# Patient Record
Sex: MALE | Race: WHITE | NOT HISPANIC OR LATINO | URBAN - METROPOLITAN AREA
[De-identification: names, ages, dates, MRNs, and addresses within clinical notes are randomized per-mention and may not be internally consistent; named-entity substitution may affect disease eponyms.]

---

## 2021-01-12 ENCOUNTER — EMERGENCY (EMERGENCY)
Facility: HOSPITAL | Age: 71
LOS: 1 days | Discharge: HOME | End: 2021-01-12
Attending: EMERGENCY MEDICINE | Admitting: EMERGENCY MEDICINE
Payer: MEDICARE

## 2021-01-12 VITALS
WEIGHT: 190.04 LBS | OXYGEN SATURATION: 95 % | TEMPERATURE: 98 F | HEART RATE: 141 BPM | HEIGHT: 68 IN | SYSTOLIC BLOOD PRESSURE: 151 MMHG | RESPIRATION RATE: 20 BRPM | DIASTOLIC BLOOD PRESSURE: 105 MMHG

## 2021-01-12 VITALS
RESPIRATION RATE: 16 BRPM | SYSTOLIC BLOOD PRESSURE: 125 MMHG | OXYGEN SATURATION: 98 % | DIASTOLIC BLOOD PRESSURE: 74 MMHG | HEART RATE: 107 BPM

## 2021-01-12 DIAGNOSIS — R33.9 RETENTION OF URINE, UNSPECIFIED: ICD-10-CM

## 2021-01-12 PROCEDURE — 81001 URINALYSIS AUTO W/SCOPE: CPT

## 2021-01-12 PROCEDURE — 87086 URINE CULTURE/COLONY COUNT: CPT

## 2021-01-12 PROCEDURE — 85025 COMPLETE CBC W/AUTO DIFF WBC: CPT

## 2021-01-12 PROCEDURE — 99284 EMERGENCY DEPT VISIT MOD MDM: CPT

## 2021-01-12 PROCEDURE — 80053 COMPREHEN METABOLIC PANEL: CPT

## 2021-01-12 PROCEDURE — 36415 COLL VENOUS BLD VENIPUNCTURE: CPT

## 2021-01-12 PROCEDURE — 51702 INSERT TEMP BLADDER CATH: CPT

## 2021-01-12 PROCEDURE — 96374 THER/PROPH/DIAG INJ IV PUSH: CPT | Mod: XU

## 2021-01-12 PROCEDURE — 51703 INSERT BLADDER CATH COMPLEX: CPT

## 2021-01-12 PROCEDURE — 99284 EMERGENCY DEPT VISIT MOD MDM: CPT | Mod: 25

## 2021-01-12 RX ORDER — TAMSULOSIN HYDROCHLORIDE 0.4 MG/1
1 CAPSULE ORAL
Qty: 30 | Refills: 0
Start: 2021-01-12 | End: 2021-02-10

## 2021-01-12 RX ORDER — CEFTRIAXONE 500 MG/1
1000 INJECTION, POWDER, FOR SOLUTION INTRAMUSCULAR; INTRAVENOUS ONCE
Refills: 0 | Status: COMPLETED | OUTPATIENT
Start: 2021-01-12 | End: 2021-01-12

## 2021-01-12 RX ORDER — CEFPODOXIME PROXETIL 100 MG
1 TABLET ORAL
Qty: 14 | Refills: 0
Start: 2021-01-12 | End: 2021-01-18

## 2021-01-12 RX ADMIN — CEFTRIAXONE 100 MILLIGRAM(S): 500 INJECTION, POWDER, FOR SOLUTION INTRAMUSCULAR; INTRAVENOUS at 13:32

## 2021-01-12 NOTE — ED PROVIDER NOTE - CLINICAL SUMMARY MEDICAL DECISION MAKING FREE TEXT BOX
urinary retention - mackey placed urinary retention - mackey placed- elev wbc count noted- will cover w abx- urology fu in - 2-3 days

## 2021-01-12 NOTE — ED PROVIDER NOTE - NSFOLLOWUPINSTRUCTIONS_ED_ALL_ED_FT
URINARY RETENTION IN MEN - AfterCare(R) Instructions(ER/ED)           Urinary Retention in Men    WHAT YOU NEED TO KNOW:    Urinary retention is a condition that develops when your bladder does not empty completely when you urinate.     Male Reproductive System         DISCHARGE INSTRUCTIONS:    Medicines:   •Medicines can help decrease the size of your prostate, fight infection, and help you urinate more easily.      •Take your medicine as directed. Contact your healthcare provider if you think your medicine is not helping or if you have side effects. Tell him or her if you are allergic to any medicine. Keep a list of the medicines, vitamins, and herbs you take. Include the amounts, and when and why you take them. Bring the list or the pill bottles to follow-up visits. Carry your medicine list with you in case of an emergency.      Weir catheter care: You may need a Weir catheter for up to 2 weeks at home. Healthcare providers will give you a smaller leg bag to collect urine. Keep the bag below your waist. This will prevent urine from flowing back into your bladder and causing an infection or other problems. Also, keep the tube free of kinks so the urine will drain properly. Do not pull on the catheter. This can cause pain and bleeding, and may cause the catheter to come out. Ask your healthcare provider or urologist for more information on Weir catheter care.    Urinate regularly: When your catheter is removed, do not let your bladder become too full before you urinate. Set regular times each day to urinate. Urinate as soon as you feel the need or at least every 3 hours while you are awake. Do not drink liquids before you go to bed. Urinate right before you go to bed.    Follow up with your healthcare provider or urologist as directed: Write down your questions so you remember to ask them during your visits.     Contact your healthcare provider or urologist if:   •You have a fever.      •You have pain when you urinate.      •You have blood in your urine.      •You have problems with your catheter.      •You have questions or concerns about your condition or care.      Return to the emergency department if:   •You have severe abdominal pain.      •You are breathing faster than usual.      •Your heartbeat is faster than usual.      •Your face, hands, feet, or ankles are swollen.          © Copyright [employer] 2021           back to top                          © Copyright [employer] 2021

## 2021-01-12 NOTE — PROCEDURE NOTE - NSURITECHNIQUE_GU_A_CORE
Proper hand hygiene was performed/Sterile gloves were worn for the duration of the procedure/A sterile drape was used to cover all adjacent areas/The catheter was appropriately lubricated/The catheter was secured with a securement device (e.g. StatLock)/The urinary drainage system is closed at the end of the procedure/The collection bag is below the level of the patient and urinary bladder/All applicable medical record documentation is completed

## 2021-01-12 NOTE — ED ADULT NURSE NOTE - CHPI ED NUR SYMPTOMS NEG
no blood in stool/no burning urination/no chills/no diarrhea/no dysuria/no hematuria/no nausea/no vomiting

## 2021-01-12 NOTE — ED PROVIDER NOTE - OBJECTIVE STATEMENT
70 M hx of prostate partial removal 7 yrs ago- presents w difficulty urinating over the past 2-3 days- only passing small amount of urine, increasing lower abd pressure/pain  urge to urinate- only tiny amount comes out  no sig exac factors  feeling relief after catheter placement  moderate-severe

## 2021-01-12 NOTE — ED ADULT NURSE NOTE - IS THE PATIENT ABLE TO BE SCREENED?
Recorded as Task  Date: 09/13/2017 04:02 PM, Created By: YODIT WHITAKER  Task Name: 4. Patient Message  Assigned To: Sydni Ibarra  Regarding Patient: MALACHI SCOTT, Status: Active  Comment:   YODIT WHITAKER - 13 Sep 2017 4:02 PM    TASK CREATED  Tell the patient that her ferritin level was fine and no further iron replacement therapy is needed.  Mai Curry - 14 Sep 2017 10:07 AM    TASK EDITED  Sydni Ibarra - 14 Sep 2017 10:49 AM    TASK REPLIED TO: Previously Assigned To Sydni Ibarra               called patient and told her to stop iron replacement.  pt voices understandin and is thankful for call      Electronically signed by:Sydni Ibarra   Sep 14 2017 10:50AM CST    
Yes

## 2021-01-12 NOTE — ED ADULT NURSE REASSESSMENT NOTE - NS ED NURSE REASSESS COMMENT FT1
Reeval by Dr. Quintanilla.  Weir catheter bag changed to leg bag, leg bag teaching provided, patient performed teach back and verbalized understanding of instructions.  IV abx infusing per order, will discharge after.

## 2021-01-12 NOTE — ED ADULT NURSE REASSESSMENT NOTE - NS ED NURSE REASSESS COMMENT FT1
Assumed care of patient at this time, 20g IV noted L AC, patient reporting difficulty urinating "the entire weekend."  States "dribbling" x 4 days, went to urgent care yesterday where he was given "a medication."  Now reporting significant pain.  Another RN attempted mackey catheter placement with resistance, patient evaluated by Dr. Quintanilla, urology PA now at the bedside.

## 2021-01-12 NOTE — ED ADULT NURSE REASSESSMENT NOTE - NS ED NURSE REASSESS COMMENT FT1
Weir catheter placed by urology PA, drained approx 1300ml oscar urine.  Vitals rechecked, patient feels significant relief.

## 2021-01-12 NOTE — ED ADULT NURSE NOTE - NSIMPLEMENTINTERV_GEN_ALL_ED
Implemented All Universal Safety Interventions:  Tony to call system. Call bell, personal items and telephone within reach. Instruct patient to call for assistance. Room bathroom lighting operational. Non-slip footwear when patient is off stretcher. Physically safe environment: no spills, clutter or unnecessary equipment. Stretcher in lowest position, wheels locked, appropriate side rails in place.

## 2021-01-12 NOTE — ED PROVIDER NOTE - PATIENT PORTAL LINK FT
You can access the FollowMyHealth Patient Portal offered by [facility] by registering at the following website: [email or web address]. By joining [employer]’s FollowMyHealth portal, you will also be able to view your health information using other applications (apps) compatible with our system.

## 2021-01-12 NOTE — ED ADULT NURSE NOTE - OBJECTIVE STATEMENT
70 y.o M a&ox4 walked in from triage c.o urinary retention. x 3 days of urinary retention, has only been able to uriante " a little biut." was txt for UTI x 1 week ago, unknown abx, took full course. PMH of BPH. denies fevers, chills, SOB, CP, dizziness, n/v/d.

## 2021-01-15 ENCOUNTER — APPOINTMENT (OUTPATIENT)
Dept: UROLOGY | Facility: CLINIC | Age: 71
End: 2021-01-15
Payer: MEDICARE

## 2021-01-15 PROCEDURE — 99204 OFFICE O/P NEW MOD 45 MIN: CPT

## 2021-01-15 NOTE — PHYSICAL EXAM
[General Appearance - Well Developed] : well developed [Normal Appearance] : normal appearance [General Appearance - In No Acute Distress] : no acute distress [Heart Rate And Rhythm] : Heart rate and rhythm were normal [] : no respiratory distress [Abdomen Soft] : soft [Abdomen Tenderness] : non-tender [Abdomen Hernia] : no hernia was discovered [Costovertebral Angle Tenderness] : no ~M costovertebral angle tenderness [Penis Abnormality] : normal circumcised penis [Scrotum] : the scrotum was normal [Testes Tenderness] : no tenderness of the testes [Testes Mass (___cm)] : there were no testicular masses [Prostate Tenderness] : the prostate was not tender [No Prostate Nodules] : no prostate nodules [Prostate Size ___ (0-4)] : prostate size [unfilled] (scale: 0-4) [Normal Station and Gait] : the gait and station were normal for the patient's age [Skin Color & Pigmentation] : normal skin color and pigmentation [No Focal Deficits] : no focal deficits [Oriented To Time, Place, And Person] : oriented to person, place, and time [FreeTextEntry1] : 14 Fr mackey noted to be on significant tension, draining clear yellow urine, Mackey catheter readjusted  and confirmed to be in proper position with gentle irrigation using NS.

## 2021-01-15 NOTE — HISTORY OF PRESENT ILLNESS
[FreeTextEntry1] : 69 yo male with hx of BPH s/p TURP in 2014 presented to the [facility] ED on Monday (1/11) with difficulty voiding.  He first started havign trouble over the previous weekend when he was only able to void small volumes and frequently.  Urgent care treated him for possible UTI, but he was still unable to void fully so he finally presented tot he ED.  He had a mackey catheter placed and discharged with tamsulosin.\par \par He notes that he voided without difficulty for the first 5 years after his TURP, but in the last year or so he has noticed increased difficulty voiding complaining of frequency and a slow stream.  He denies dysuria or hematuria.\par \par Today he notes that the catheter is bothering him and that there is a lot of pressure in his penis.

## 2021-01-15 NOTE — ASSESSMENT
[FreeTextEntry1] : 69 yo male with BPH and urinary retention.  We will leave mackey in place till next Thursday.  He will continue taking daily tamsulosin.  If he fails his TOV then we will proceed with cystoscopy and consider the need for additional treatment of his bladder outlet.

## 2021-01-16 ENCOUNTER — EMERGENCY (EMERGENCY)
Facility: HOSPITAL | Age: 71
LOS: 1 days | Discharge: HOME | End: 2021-01-16
Admitting: EMERGENCY MEDICINE
Payer: MEDICARE

## 2021-01-16 VITALS
HEIGHT: 68 IN | WEIGHT: 190.04 LBS | TEMPERATURE: 98 F | OXYGEN SATURATION: 99 % | HEART RATE: 108 BPM | DIASTOLIC BLOOD PRESSURE: 102 MMHG | RESPIRATION RATE: 18 BRPM | SYSTOLIC BLOOD PRESSURE: 161 MMHG

## 2021-01-16 VITALS
TEMPERATURE: 98 F | OXYGEN SATURATION: 97 % | DIASTOLIC BLOOD PRESSURE: 93 MMHG | SYSTOLIC BLOOD PRESSURE: 156 MMHG | HEART RATE: 92 BPM | RESPIRATION RATE: 18 BRPM

## 2021-01-16 DIAGNOSIS — T83.098A OTHER MECHANICAL COMPLICATION OF OTHER URINARY CATHETER, INITIAL ENCOUNTER: ICD-10-CM
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
## 2021-01-16 PROCEDURE — 99282 EMERGENCY DEPT VISIT SF MDM: CPT

## 2021-01-16 RX ORDER — SODIUM CHLORIDE 9 MG/ML
1000 INJECTION INTRAMUSCULAR; INTRAVENOUS; SUBCUTANEOUS ONCE
Refills: 0 | Status: DISCONTINUED | OUTPATIENT
Start: 2021-01-16 | End: 2021-01-16

## 2021-01-16 NOTE — ED PROVIDER NOTE - CARE PROVIDER_API CALL
Buck Myers)  Urology  [address] [place] [address], [place] [address]  Phone: [phone]  Fax: [phone]  Follow Up Time:

## 2021-01-16 NOTE — ED PROVIDER NOTE - NSFOLLOWUPINSTRUCTIONS_ED_ALL_ED_FT
BRODY CATHETER PLACEMENT AND CARE - AfterCare(R) Instructions(ER/ED)           Brody Catheter Placement and Care    WHAT YOU NEED TO KNOW:    A Brody catheter is a sterile tube that is inserted into your bladder to drain urine. It is also called an indwelling urinary catheter.     DISCHARGE INSTRUCTIONS:    Return to the emergency department if:   •Your catheter comes out.       •You suddenly have material that looks like sand in the tubing or drainage bag.      •No urine is draining into the bag and you have checked the system.      •You have pain in your hip, back, pelvis, or lower abdomen.      •You are confused or cannot think clearly.      Call your doctor or urologist if:   •You have a fever.      •You have bladder spasms for more than 1 day after the catheter is placed.      •You see blood in the tubing or drainage bag.      •You have a rash or itching where the catheter tube is secured to your skin.      •Urine leaks from or around the catheter, tubing, or drainage bag.      •The closed drainage system has accidently come open or apart.       •You see a layer of crystals inside the tubing.      •You have questions or concerns about your condition or care.      Care for your catheter and drainage bag: You can reduce your risk for infection and injury by caring for your catheter and drainage bag properly.  •Wash your hands often. Wash before and after you touch your catheter, tubing, or drainage bag. Use soap and water. Wear clean disposable gloves when you care for your catheter or disconnect the drainage bag. Wash your hands before you prepare or eat food.   Handwashing           •Clean your genital area 2 times every day. Clean your catheter area and anal opening after every bowel movement. ?For men: Use a soapy cloth to clean the tip of your penis. Start where the catheter enters. Wipe backward making sure to pull back the foreskin. Then use a cloth with clear water in the same direction to clean away the soap.       ?For women: Use a soapy cloth to clean the area that the catheter enters your body. Make sure to separate your labia and wipe toward the anus. Then use a cloth with clear water and wipe in the same direction.       •Secure the catheter tube so you do not pull or move the catheter. This helps prevent pain and bladder spasms. Healthcare providers will show you how to use medical tape or a strap to secure the catheter tube to your body.       •Keep a closed drainage system. Your catheter should always be attached to the drainage bag to form a closed system. Do not disconnect any part of the closed system unless you need to change the bag.      •Keep the drainage bag below the level of your waist. This helps stop urine from moving back up the tubing and into your bladder. Do not loop or kink the tubing. This can cause urine to back up and collect in your bladder. Do not let the drainage bag touch or lie on the floor.      •Empty the drainage bag when needed. The weight of a full drainage bag can be painful. Empty the drainage bag every 3 to 6 hours or when it is ? full.       •Clean and change the drainage bag as directed. Ask your healthcare provider how often you should change the drainage bag and what cleaning solution to use. Wear disposable gloves when you change the bag. Do not allow the end of the catheter or tubing to touch anything. Clean the ends with an alcohol pad before you reconnect them.      What to do if problems develop:   •No urine is draining into the bag: ?Check for kinks in the tubing and straighten them out.       ?Check the tape or strap used to secure the catheter tube to your skin. Make sure it is not blocking the tube.       ?Make sure you are not sitting or lying on the tubing.      ?Make sure the urine bag is hanging below the level of your waist.      •Urine leaks from or around the catheter, tubing, or drainage bag: Check if the closed drainage system has accidently come open or apart. Clean the catheter and tubing ends with a new alcohol pad and reconnect them.       Follow up with your doctor or urologist as directed: Write down your questions so you remember to ask them during your visits.

## 2021-01-16 NOTE — ED PROVIDER NOTE - PATIENT PORTAL LINK FT
You can access the FollowMyHealth Patient Portal offered by [facility] by registering at the following website: [email or web address]. By joining [employer]’s FollowMyHealth portal, you will also be able to view your health information using other applications (apps) compatible with our system.

## 2021-01-16 NOTE — ED PROVIDER NOTE - CLINICAL SUMMARY MEDICAL DECISION MAKING FREE TEXT BOX
71 yo male in the ER with discomfort due to Weir catheter in place. Leg bag noted with clear urine, pt states he has good UO, seen by his urologist earlier today. Pt is not sure if leg bag is connected as supposed  to because he feels some pressure and pulling sensation. Leg bag re-adjusted, draining well. no need for changing it. no hematuria, no suprapubic pain or abdominal pain on exam. pt educated on Weir care. d/c home comfortable. f/u with urologist as planned recommended.

## 2021-01-16 NOTE — ED ADULT NURSE NOTE - CHPI ED NUR SYMPTOMS POS
c/o mild discomfort and pressure at urethra, mackey catheter in place PTA. draining clear/yellow urine/PAIN

## 2021-01-16 NOTE — ED ADULT TRIAGE NOTE - OTHER COMPLAINTS
reports "pulling" sensation, f/u with urology today and was told to come to ED for evaluation of placement of mackey cath. cath draining urine per patient, reports discomfort, no hematuria

## 2021-01-16 NOTE — ED PROVIDER NOTE - OBJECTIVE STATEMENT
69 yo male with h/o BPH, present to ER c/o pulling sensations in his lower abdomen due to inserted Weir Catheter. pt states he had urinary retention and Weir was placed initially 3 days ago. Pt was seen by his urologist earlier today and states his Weir was working well. Pt denies any blood in his urine, mentioned that it has been draining well.

## 2021-01-21 ENCOUNTER — APPOINTMENT (OUTPATIENT)
Dept: UROLOGY | Facility: CLINIC | Age: 71
End: 2021-01-21
Payer: MEDICARE

## 2021-01-21 PROCEDURE — 99213 OFFICE O/P EST LOW 20 MIN: CPT | Mod: 25

## 2021-01-21 PROCEDURE — 51702 INSERT TEMP BLADDER CATH: CPT

## 2021-01-21 NOTE — ASSESSMENT
[FreeTextEntry1] : 71 yo male with hx of BPH and urinary retention patient unable to void today after TOV.  Discussed options including repeat TOV next week vs. proceeding with further evaluation with TRUS US and cystoscopy for treatment planning.  Patient would like to attempt another TOV next week.  If he is unable to void again them we will proceed at that visit with cystoscopy and TRUS.

## 2021-01-21 NOTE — HISTORY OF PRESENT ILLNESS
[FreeTextEntry1] : Patient with recent hx of urinary retention with Weir in place returns to the office for trial of void.  He has been taking tamsulosin daily.  \par \par \par \par Active TOV:  \par -200 cc sterile saline instilled in bladder\par -Weir balloon deflated and catheter removed in its entirety\par -Patient unable to void after 2 hours, US demonstrates > 400 cc in the bladder\par -Using sterile technique 16 Fr coude catheter reinserted in the bladder with spontaneous return of urine.  500 cc drained.(see procedure note)\par

## 2021-01-21 NOTE — PHYSICAL EXAM
[General Appearance - Well Developed] : well developed [Normal Appearance] : normal appearance [General Appearance - In No Acute Distress] : no acute distress [Bowel Sounds] : normal bowel sounds [Penis Abnormality] : normal circumcised penis [Skin Color & Pigmentation] : normal skin color and pigmentation [Heart Rate And Rhythm] : Heart rate and rhythm were normal [] : no respiratory distress [Oriented To Time, Place, And Person] : oriented to person, place, and time [Normal Station and Gait] : the gait and station were normal for the patient's age [No Focal Deficits] : no focal deficits [FreeTextEntry1] : 14 Fr Weir draining clear yellow urine

## 2021-01-22 LAB
APPEARANCE: CLEAR
BACTERIA: NEGATIVE
BILIRUBIN URINE: NEGATIVE
BLOOD URINE: ABNORMAL
COLOR: COLORLESS
GLUCOSE QUALITATIVE U: NEGATIVE
HYALINE CASTS: 2 /LPF
KETONES URINE: NEGATIVE
LEUKOCYTE ESTERASE URINE: ABNORMAL
MICROSCOPIC-UA: NORMAL
NITRITE URINE: NEGATIVE
PH URINE: 6
PROTEIN URINE: NORMAL
RED BLOOD CELLS URINE: 3 /HPF
SPECIFIC GRAVITY URINE: 1
SQUAMOUS EPITHELIAL CELLS: 2 /HPF
UROBILINOGEN URINE: NORMAL
WHITE BLOOD CELLS URINE: 3 /HPF

## 2021-01-25 LAB — BACTERIA UR CULT: NORMAL

## 2021-01-27 ENCOUNTER — NON-APPOINTMENT (OUTPATIENT)
Age: 71
End: 2021-01-27

## 2021-01-29 ENCOUNTER — APPOINTMENT (OUTPATIENT)
Dept: UROLOGY | Facility: CLINIC | Age: 71
End: 2021-01-29
Payer: MEDICARE

## 2021-01-29 PROCEDURE — 99213 OFFICE O/P EST LOW 20 MIN: CPT | Mod: 25

## 2021-01-29 PROCEDURE — 51702 INSERT TEMP BLADDER CATH: CPT

## 2021-01-29 NOTE — PHYSICAL EXAM
[General Appearance - Well Developed] : well developed [Normal Appearance] : normal appearance [General Appearance - In No Acute Distress] : no acute distress [Abdomen Soft] : soft [Abdomen Tenderness] : non-tender [Costovertebral Angle Tenderness] : no ~M costovertebral angle tenderness [Skin Color & Pigmentation] : normal skin color and pigmentation [Heart Rate And Rhythm] : Heart rate and rhythm were normal [] : no respiratory distress [Oriented To Time, Place, And Person] : oriented to person, place, and time [Normal Station and Gait] : the gait and station were normal for the patient's age [No Focal Deficits] : no focal deficits [FreeTextEntry1] : 16 Fr foely in place draining clear urine

## 2021-01-29 NOTE — ASSESSMENT
[FreeTextEntry1] : 69 yo male with BPH and urinary retention s/p TURP in the past.  He failed his TOV today.  Weir was replaced and 250 cc of cloudy urine drained.  He would like to try one more time to void before proceeding with cystoscopy and TRUS.  He will take one dose of Cipro and we will send a UA and Ucx.

## 2021-02-01 LAB
APPEARANCE: ABNORMAL
BACTERIA: ABNORMAL
BILIRUBIN URINE: NEGATIVE
BLOOD URINE: ABNORMAL
COLOR: YELLOW
GLUCOSE QUALITATIVE U: NEGATIVE
HYALINE CASTS: 0 /LPF
KETONES URINE: NEGATIVE
LEUKOCYTE ESTERASE URINE: ABNORMAL
MICROSCOPIC-UA: NORMAL
NITRITE URINE: NEGATIVE
PH URINE: 6
PROTEIN URINE: ABNORMAL
RED BLOOD CELLS URINE: 47 /HPF
SPECIFIC GRAVITY URINE: 1.01
SQUAMOUS EPITHELIAL CELLS: 1 /HPF
UROBILINOGEN URINE: NORMAL
WHITE BLOOD CELLS URINE: 499 /HPF

## 2021-02-02 DIAGNOSIS — N39.0 URINARY TRACT INFECTION, SITE NOT SPECIFIED: ICD-10-CM

## 2021-02-02 LAB — BACTERIA UR CULT: ABNORMAL

## 2021-02-04 ENCOUNTER — APPOINTMENT (OUTPATIENT)
Dept: UROLOGY | Facility: CLINIC | Age: 71
End: 2021-02-04
Payer: MEDICARE

## 2021-02-04 VITALS
DIASTOLIC BLOOD PRESSURE: 91 MMHG | WEIGHT: 190 LBS | TEMPERATURE: 97.8 F | HEART RATE: 106 BPM | OXYGEN SATURATION: 98 % | SYSTOLIC BLOOD PRESSURE: 152 MMHG | HEIGHT: 68 IN | BODY MASS INDEX: 28.79 KG/M2

## 2021-02-04 DIAGNOSIS — N40.0 BENIGN PROSTATIC HYPERPLASIA WITHOUT LOWER URINARY TRACT SYMPMS: ICD-10-CM

## 2021-02-04 PROCEDURE — 99214 OFFICE O/P EST MOD 30 MIN: CPT | Mod: 25

## 2021-02-04 PROCEDURE — 76872 US TRANSRECTAL: CPT

## 2021-02-04 NOTE — PHYSICAL EXAM
[General Appearance - Well Developed] : well developed [Normal Appearance] : normal appearance [General Appearance - In No Acute Distress] : no acute distress [Abdomen Soft] : soft [Abdomen Tenderness] : non-tender [Costovertebral Angle Tenderness] : no ~M costovertebral angle tenderness [Urethral Meatus] : meatus normal [Penis Abnormality] : normal circumcised penis [Scrotum] : the scrotum was normal [Testes Tenderness] : no tenderness of the testes [Testes Mass (___cm)] : there were no testicular masses [FreeTextEntry1] : 16 Fr foleyu draining clear yellow urine [Skin Color & Pigmentation] : normal skin color and pigmentation [Heart Rate And Rhythm] : Heart rate and rhythm were normal [] : no respiratory distress [Oriented To Time, Place, And Person] : oriented to person, place, and time [Normal Station and Gait] : the gait and station were normal for the patient's age [No Focal Deficits] : no focal deficits

## 2021-02-04 NOTE — ASSESSMENT
[FreeTextEntry1] : 71 yo male with hx of BPH now in urinary retention.  \par \par We discussed his surgical options.  He is interested in learning more about HOLEP.  He will see Dr. Valdez tomorrow for consultation.  He will decide on how he would like to proceed after his consultation .  He will continue hi abx as prescribed.

## 2021-02-04 NOTE — HISTORY OF PRESENT ILLNESS
[FreeTextEntry1] : Patient returns for another trial of void in the setting of urinary retention secondary to BPH.  He has been taking the abx prescribed to him for the UTI noted from his last catheter exchange.\par \par Trial of void:\par -240 cc sterile saline instilled in bladder\par -Mackey balloon deflated and mackey catheter removed in its entirety\par -Patient waiting 30 minutes and was unable to void\par \par TRUS performed and measured prostate at 90 g.

## 2021-02-05 ENCOUNTER — APPOINTMENT (OUTPATIENT)
Dept: UROLOGY | Facility: CLINIC | Age: 71
End: 2021-02-05
Payer: MEDICARE

## 2021-02-05 VITALS — DIASTOLIC BLOOD PRESSURE: 88 MMHG | HEART RATE: 99 BPM | TEMPERATURE: 97.6 F | SYSTOLIC BLOOD PRESSURE: 136 MMHG

## 2021-02-05 PROCEDURE — 99215 OFFICE O/P EST HI 40 MIN: CPT

## 2021-02-05 NOTE — HISTORY OF PRESENT ILLNESS
[FreeTextEntry1] : HPI per Dr. Myers 1/15/21: 69 yo male with hx of BPH s/p TURP in 2014 presented to the [facility] ED on Monday (1/11) with difficulty voiding. He first started havign trouble over the previous weekend when he was only able to void small volumes and frequently. Urgent care treated him for possible UTI, but he was still unable to void fully so he finally presented tot he ED. He had a mackey catheter placed and discharged with tamsulosin.\par \par He notes that he voided without difficulty for the first 5 years after his TURP, but in the last year or so he has noticed increased difficulty voiding complaining of frequency and a slow stream. He denies dysuria or hematuria.\par \par Today he notes that the catheter is bothering him and that there is a lot of pressure in his penis. \par \par 2/5/21: Patient has failed subsequent void trials and remains catheter dependent. He presents today to discuss surgical options for treatment, in particular prostate enucleation given measured prostate volume of 90 cc. He currently remains bothered by indwelling catheter, denies fevers, chills, flank pain or hematuria.

## 2021-02-05 NOTE — PHYSICAL EXAM
[General Appearance - Well Developed] : well developed [General Appearance - Well Nourished] : well nourished [Normal Appearance] : normal appearance [Well Groomed] : well groomed [General Appearance - In No Acute Distress] : no acute distress [Abdomen Tenderness] : non-tender [Abdomen Soft] : soft [Costovertebral Angle Tenderness] : no ~M costovertebral angle tenderness [FreeTextEntry1] : Deferred [Edema] : no peripheral edema [] : no respiratory distress [Respiration, Rhythm And Depth] : normal respiratory rhythm and effort [Exaggerated Use Of Accessory Muscles For Inspiration] : no accessory muscle use [Oriented To Time, Place, And Person] : oriented to person, place, and time [Affect] : the affect was normal [Mood] : the mood was normal [Not Anxious] : not anxious [Normal Station and Gait] : the gait and station were normal for the patient's age [No Focal Deficits] : no focal deficits [No Palpable Adenopathy] : no palpable adenopathy

## 2021-02-12 ENCOUNTER — APPOINTMENT (OUTPATIENT)
Dept: UROLOGY | Facility: CLINIC | Age: 71
End: 2021-02-12
Payer: MEDICARE

## 2021-02-12 VITALS
HEART RATE: 101 BPM | DIASTOLIC BLOOD PRESSURE: 84 MMHG | TEMPERATURE: 98 F | SYSTOLIC BLOOD PRESSURE: 150 MMHG | OXYGEN SATURATION: 98 %

## 2021-02-12 PROCEDURE — 51741 ELECTRO-UROFLOWMETRY FIRST: CPT

## 2021-02-12 PROCEDURE — 51798 US URINE CAPACITY MEASURE: CPT | Mod: 59

## 2021-02-12 PROCEDURE — 51728 CYSTOMETROGRAM W/VP: CPT

## 2021-02-12 PROCEDURE — 52000 CYSTOURETHROSCOPY: CPT

## 2021-02-12 PROCEDURE — 99213 OFFICE O/P EST LOW 20 MIN: CPT | Mod: 25

## 2021-02-12 PROCEDURE — 51797 INTRAABDOMINAL PRESSURE TEST: CPT

## 2021-02-12 PROCEDURE — 51784 ANAL/URINARY MUSCLE STUDY: CPT

## 2021-02-12 NOTE — PHYSICAL EXAM
[General Appearance - Well Developed] : well developed [Normal Appearance] : normal appearance [General Appearance - Well Nourished] : well nourished [Well Groomed] : well groomed [General Appearance - In No Acute Distress] : no acute distress [Abdomen Soft] : soft [Abdomen Tenderness] : non-tender [Costovertebral Angle Tenderness] : no ~M costovertebral angle tenderness [Urethral Meatus] : meatus normal [Urinary Bladder Findings] : the bladder was normal on palpation [Scrotum] : the scrotum was normal [Testes Mass (___cm)] : there were no testicular masses [Edema] : no peripheral edema [] : no respiratory distress [Respiration, Rhythm And Depth] : normal respiratory rhythm and effort [Exaggerated Use Of Accessory Muscles For Inspiration] : no accessory muscle use [Oriented To Time, Place, And Person] : oriented to person, place, and time [Affect] : the affect was normal [Mood] : the mood was normal [Not Anxious] : not anxious [Normal Station and Gait] : the gait and station were normal for the patient's age [No Focal Deficits] : no focal deficits [No Palpable Adenopathy] : no palpable adenopathy

## 2021-02-12 NOTE — HISTORY OF PRESENT ILLNESS
[FreeTextEntry1] : HPI per Dr. Myers 1/15/21: 71 yo male with hx of BPH s/p TURP in [address] the [facility] ED on Monday (1/11) with difficulty voiding. He first started havign trouble over the previous weekend when he was only able to void small volumes and frequently. Urgent care treated him for possible UTI, but he was still unable to void fully so he finally presented tot he ED. He had a mackey catheter placed and discharged with tamsulosin.\par \par He notes that he voided without difficulty for the first 5 years after his TURP, but in the last year or so he has noticed increased difficulty voiding complaining of frequency and a slow stream. He denies dysuria or hematuria.\par \par Today he notes that the catheter is bothering him and that there is a lot of pressure in his penis. \par \par 2/5/21: Patient has failed subsequent void trials and remains catheter dependent. He presents today to discuss surgical options for treatment, in particular prostate enucleation given measured prostate volume of 90 cc. He currently remains bothered by indwelling catheter, denies fevers, chills, flank pain or hematuria.\par \par 2/12/21: Patient presents for UDS and cystoscopy. No changes to health in the interim. Remains catheter dependent.

## 2021-03-01 ENCOUNTER — APPOINTMENT (OUTPATIENT)
Dept: INTERNAL MEDICINE | Facility: CLINIC | Age: 71
End: 2021-03-01
Payer: MEDICARE

## 2021-03-01 ENCOUNTER — MED ADMIN CHARGE (OUTPATIENT)
Age: 71
End: 2021-03-01

## 2021-03-01 VITALS
HEART RATE: 102 BPM | BODY MASS INDEX: 29.4 KG/M2 | WEIGHT: 194 LBS | OXYGEN SATURATION: 97 % | RESPIRATION RATE: 15 BRPM | HEIGHT: 68 IN | TEMPERATURE: 98.7 F | SYSTOLIC BLOOD PRESSURE: 132 MMHG | DIASTOLIC BLOOD PRESSURE: 84 MMHG

## 2021-03-01 DIAGNOSIS — Z12.11 ENCOUNTER FOR SCREENING FOR MALIGNANT NEOPLASM OF COLON: ICD-10-CM

## 2021-03-01 DIAGNOSIS — Z23 ENCOUNTER FOR IMMUNIZATION: ICD-10-CM

## 2021-03-01 DIAGNOSIS — E63.9 NUTRITIONAL DEFICIENCY, UNSPECIFIED: ICD-10-CM

## 2021-03-01 PROCEDURE — 99204 OFFICE O/P NEW MOD 45 MIN: CPT | Mod: GC,25

## 2021-03-01 PROCEDURE — 90662 IIV NO PRSV INCREASED AG IM: CPT

## 2021-03-01 PROCEDURE — 36415 COLL VENOUS BLD VENIPUNCTURE: CPT

## 2021-03-01 PROCEDURE — G0009: CPT

## 2021-03-01 PROCEDURE — G0008: CPT

## 2021-03-01 PROCEDURE — 93000 ELECTROCARDIOGRAM COMPLETE: CPT

## 2021-03-01 PROCEDURE — 90732 PPSV23 VACC 2 YRS+ SUBQ/IM: CPT

## 2021-03-01 RX ORDER — CHLORHEXIDINE GLUCONATE 4 %
5 LIQUID (ML) TOPICAL
Qty: 30 | Refills: 3 | Status: ACTIVE | COMMUNITY
Start: 2021-03-01 | End: 1900-01-01

## 2021-03-02 LAB
ALBUMIN SERPL ELPH-MCNC: 4.6 G/DL
ALP BLD-CCNC: 93 U/L
ALT SERPL-CCNC: 16 U/L
ANION GAP SERPL CALC-SCNC: 15 MMOL/L
APTT BLD: 42.1 SEC
AST SERPL-CCNC: 18 U/L
BACTERIA UR CULT: NORMAL
BASOPHILS # BLD AUTO: 0.04 K/UL
BASOPHILS NFR BLD AUTO: 0.4 %
BILIRUB SERPL-MCNC: 0.5 MG/DL
BUN SERPL-MCNC: 15 MG/DL
CALCIUM SERPL-MCNC: 9.2 MG/DL
CHLORIDE SERPL-SCNC: 104 MMOL/L
CHOLEST SERPL-MCNC: 192 MG/DL
CO2 SERPL-SCNC: 22 MMOL/L
CREAT SERPL-MCNC: 1.12 MG/DL
EOSINOPHIL # BLD AUTO: 0.1 K/UL
EOSINOPHIL NFR BLD AUTO: 1.1 %
ESTIMATED AVERAGE GLUCOSE: 111 MG/DL
GLUCOSE SERPL-MCNC: 88 MG/DL
HBA1C MFR BLD HPLC: 5.5 %
HCT VFR BLD CALC: 49.8 %
HDLC SERPL-MCNC: 47 MG/DL
HGB BLD-MCNC: 15.3 G/DL
IMM GRANULOCYTES NFR BLD AUTO: 0.3 %
INR PPP: 1.11 RATIO
LDLC SERPL CALC-MCNC: 125 MG/DL
LYMPHOCYTES # BLD AUTO: 0.99 K/UL
LYMPHOCYTES NFR BLD AUTO: 10.9 %
MAN DIFF?: NORMAL
MCHC RBC-ENTMCNC: 27.5 PG
MCHC RBC-ENTMCNC: 30.7 GM/DL
MCV RBC AUTO: 89.4 FL
MONOCYTES # BLD AUTO: 0.4 K/UL
MONOCYTES NFR BLD AUTO: 4.4 %
NEUTROPHILS # BLD AUTO: 7.54 K/UL
NEUTROPHILS NFR BLD AUTO: 82.9 %
NONHDLC SERPL-MCNC: 145 MG/DL
PLATELET # BLD AUTO: 219 K/UL
POTASSIUM SERPL-SCNC: 3.9 MMOL/L
PROT SERPL-MCNC: 7.7 G/DL
PT BLD: 13 SEC
RBC # BLD: 5.57 M/UL
RBC # FLD: 13.7 %
SODIUM SERPL-SCNC: 141 MMOL/L
TRIGL SERPL-MCNC: 100 MG/DL
WBC # FLD AUTO: 9.1 K/UL

## 2021-03-08 VITALS
HEIGHT: 68 IN | OXYGEN SATURATION: 97 % | DIASTOLIC BLOOD PRESSURE: 93 MMHG | SYSTOLIC BLOOD PRESSURE: 151 MMHG | HEART RATE: 95 BPM | TEMPERATURE: 97 F | RESPIRATION RATE: 18 BRPM | WEIGHT: 195.77 LBS

## 2021-03-08 NOTE — ASU PATIENT PROFILE, ADULT - PMH
Anxiety    BPH (benign prostatic hyperplasia)    Urine retention    UTI (urinary tract infection)

## 2021-03-09 ENCOUNTER — APPOINTMENT (OUTPATIENT)
Dept: UROLOGY | Facility: HOSPITAL | Age: 71
End: 2021-03-09

## 2021-03-10 ENCOUNTER — EMERGENCY (EMERGENCY)
Facility: HOSPITAL | Age: 71
LOS: 1 days | Discharge: HOME | End: 2021-03-10
Attending: EMERGENCY MEDICINE | Admitting: EMERGENCY MEDICINE
Payer: MEDICARE

## 2021-03-10 VITALS
SYSTOLIC BLOOD PRESSURE: 148 MMHG | TEMPERATURE: 98 F | DIASTOLIC BLOOD PRESSURE: 95 MMHG | RESPIRATION RATE: 18 BRPM | HEART RATE: 108 BPM | HEIGHT: 68 IN | OXYGEN SATURATION: 95 %

## 2021-03-10 DIAGNOSIS — N39.0 URINARY TRACT INFECTION, SITE NOT SPECIFIED: ICD-10-CM

## 2021-03-10 DIAGNOSIS — Z90.79 ACQUIRED ABSENCE OF OTHER GENITAL ORGAN(S): Chronic | ICD-10-CM

## 2021-03-10 DIAGNOSIS — R33.9 RETENTION OF URINE, UNSPECIFIED: ICD-10-CM

## 2021-03-10 LAB
ALBUMIN SERPL ELPH-MCNC: 4 G/DL — SIGNIFICANT CHANGE UP (ref 3.3–5)
ALP SERPL-CCNC: 90 U/L — SIGNIFICANT CHANGE UP (ref 40–120)
ALT FLD-CCNC: 13 U/L — SIGNIFICANT CHANGE UP (ref 10–45)
ANION GAP SERPL CALC-SCNC: 11 MMOL/L — SIGNIFICANT CHANGE UP (ref 5–17)
APPEARANCE UR: ABNORMAL
AST SERPL-CCNC: 22 U/L — SIGNIFICANT CHANGE UP (ref 10–40)
BACTERIA # UR AUTO: ABNORMAL /HPF
BASOPHILS # BLD AUTO: 0.04 K/UL — SIGNIFICANT CHANGE UP (ref 0–0.2)
BASOPHILS NFR BLD AUTO: 0.4 % — SIGNIFICANT CHANGE UP (ref 0–2)
BILIRUB SERPL-MCNC: 0.6 MG/DL — SIGNIFICANT CHANGE UP (ref 0.2–1.2)
BILIRUB UR-MCNC: NEGATIVE — SIGNIFICANT CHANGE UP
BUN SERPL-MCNC: 27 MG/DL — HIGH (ref 7–23)
CALCIUM SERPL-MCNC: 9.5 MG/DL — SIGNIFICANT CHANGE UP (ref 8.4–10.5)
CHLORIDE SERPL-SCNC: 102 MMOL/L — SIGNIFICANT CHANGE UP (ref 96–108)
CO2 SERPL-SCNC: 23 MMOL/L — SIGNIFICANT CHANGE UP (ref 22–31)
COLOR SPEC: YELLOW — SIGNIFICANT CHANGE UP
COMMENT - URINE: SIGNIFICANT CHANGE UP
COMMENT - URINE: SIGNIFICANT CHANGE UP
CREAT SERPL-MCNC: 1.45 MG/DL — HIGH (ref 0.5–1.3)
DIFF PNL FLD: ABNORMAL
EOSINOPHIL # BLD AUTO: 0.2 K/UL — SIGNIFICANT CHANGE UP (ref 0–0.5)
EOSINOPHIL NFR BLD AUTO: 1.9 % — SIGNIFICANT CHANGE UP (ref 0–6)
EPI CELLS # UR: SIGNIFICANT CHANGE UP /HPF (ref 0–5)
GLUCOSE SERPL-MCNC: 113 MG/DL — HIGH (ref 70–99)
GLUCOSE UR QL: NEGATIVE — SIGNIFICANT CHANGE UP
HCT VFR BLD CALC: 47 % — SIGNIFICANT CHANGE UP (ref 39–50)
HGB BLD-MCNC: 15 G/DL — SIGNIFICANT CHANGE UP (ref 13–17)
IMM GRANULOCYTES NFR BLD AUTO: 0.4 % — SIGNIFICANT CHANGE UP (ref 0–1.5)
KETONES UR-MCNC: NEGATIVE — SIGNIFICANT CHANGE UP
LEUKOCYTE ESTERASE UR-ACNC: ABNORMAL
LYMPHOCYTES # BLD AUTO: 2.26 K/UL — SIGNIFICANT CHANGE UP (ref 1–3.3)
LYMPHOCYTES # BLD AUTO: 20.9 % — SIGNIFICANT CHANGE UP (ref 13–44)
MCHC RBC-ENTMCNC: 26.9 PG — LOW (ref 27–34)
MCHC RBC-ENTMCNC: 31.9 GM/DL — LOW (ref 32–36)
MCV RBC AUTO: 84.4 FL — SIGNIFICANT CHANGE UP (ref 80–100)
MONOCYTES # BLD AUTO: 0.89 K/UL — SIGNIFICANT CHANGE UP (ref 0–0.9)
MONOCYTES NFR BLD AUTO: 8.2 % — SIGNIFICANT CHANGE UP (ref 2–14)
NEUTROPHILS # BLD AUTO: 7.37 K/UL — SIGNIFICANT CHANGE UP (ref 1.8–7.4)
NEUTROPHILS NFR BLD AUTO: 68.2 % — SIGNIFICANT CHANGE UP (ref 43–77)
NITRITE UR-MCNC: POSITIVE
NRBC # BLD: 0 /100 WBCS — SIGNIFICANT CHANGE UP (ref 0–0)
PH UR: 6 — SIGNIFICANT CHANGE UP (ref 5–8)
PLATELET # BLD AUTO: 212 K/UL — SIGNIFICANT CHANGE UP (ref 150–400)
POTASSIUM SERPL-MCNC: 4.2 MMOL/L — SIGNIFICANT CHANGE UP (ref 3.5–5.3)
POTASSIUM SERPL-SCNC: 4.2 MMOL/L — SIGNIFICANT CHANGE UP (ref 3.5–5.3)
PROT SERPL-MCNC: 7.3 G/DL — SIGNIFICANT CHANGE UP (ref 6–8.3)
PROT UR-MCNC: 100 MG/DL
RBC # BLD: 5.57 M/UL — SIGNIFICANT CHANGE UP (ref 4.2–5.8)
RBC # FLD: 13.3 % — SIGNIFICANT CHANGE UP (ref 10.3–14.5)
RBC CASTS # UR COMP ASSIST: >25 /HPF — SIGNIFICANT CHANGE UP
SODIUM SERPL-SCNC: 136 MMOL/L — SIGNIFICANT CHANGE UP (ref 135–145)
SP GR SPEC: 1.02 — SIGNIFICANT CHANGE UP (ref 1–1.03)
UROBILINOGEN FLD QL: 0.2 E.U./DL — SIGNIFICANT CHANGE UP
WBC # BLD: 10.8 K/UL — HIGH (ref 3.8–10.5)
WBC # FLD AUTO: 10.8 K/UL — HIGH (ref 3.8–10.5)
WBC UR QL: ABNORMAL /HPF

## 2021-03-10 PROCEDURE — 87186 SC STD MICRODIL/AGAR DIL: CPT

## 2021-03-10 PROCEDURE — 87181 SC STD AGAR DILUTION PER AGT: CPT

## 2021-03-10 PROCEDURE — 80053 COMPREHEN METABOLIC PANEL: CPT

## 2021-03-10 PROCEDURE — 81001 URINALYSIS AUTO W/SCOPE: CPT

## 2021-03-10 PROCEDURE — 85025 COMPLETE CBC W/AUTO DIFF WBC: CPT

## 2021-03-10 PROCEDURE — 99284 EMERGENCY DEPT VISIT MOD MDM: CPT

## 2021-03-10 PROCEDURE — 36415 COLL VENOUS BLD VENIPUNCTURE: CPT

## 2021-03-10 PROCEDURE — 99284 EMERGENCY DEPT VISIT MOD MDM: CPT | Mod: 25

## 2021-03-10 PROCEDURE — 87086 URINE CULTURE/COLONY COUNT: CPT

## 2021-03-10 RX ORDER — CEFUROXIME AXETIL 250 MG
250 TABLET ORAL ONCE
Refills: 0 | Status: COMPLETED | OUTPATIENT
Start: 2021-03-10 | End: 2021-03-10

## 2021-03-10 RX ORDER — SODIUM CHLORIDE 9 MG/ML
1000 INJECTION INTRAMUSCULAR; INTRAVENOUS; SUBCUTANEOUS ONCE
Refills: 0 | Status: COMPLETED | OUTPATIENT
Start: 2021-03-10 | End: 2021-03-10

## 2021-03-10 RX ORDER — CEFUROXIME AXETIL 250 MG
1 TABLET ORAL
Qty: 20 | Refills: 0
Start: 2021-03-10 | End: 2021-03-19

## 2021-03-10 RX ADMIN — Medication 250 MILLIGRAM(S): at 23:12

## 2021-03-10 NOTE — ED PROVIDER NOTE - PATIENT PORTAL LINK FT
You can access the FollowMyHealth Patient Portal offered by [facility] by registering at the following website: [email or web address]. By joining [facility]’s FollowMyHealth portal, you will also be able to view your health information using other applications (apps) compatible with our system.

## 2021-03-10 NOTE — ED ADULT TRIAGE NOTE - OTHER COMPLAINTS
pt with a Weir since January for enlarged prostate, Weir changed today at noon, since then pt unable to void, appearing uncomfortable but in NAD

## 2021-03-10 NOTE — ED ADULT NURSE NOTE - OBJECTIVE STATEMENT
Pt is a 70y male complaining of urinary retention since 1pm today. Hx of BPH. pt has had mackey placed since January. mackey was replaced today at 1pm and pt has had inadequate urine output since then. Minimal urine output noted. PT denies abdominal pain and discomfort. Ultrasound of bladder performed @ bedside by dr. lucio. No urinary retention noted. Mackey flushed at bedside. Makcey now draining adequately. Pt reports not drinking much water today. 20G L AC placed. Labs sent.

## 2021-03-10 NOTE — ED PROVIDER NOTE - ENMT, MLM
139.9
Airway patent, Nasal mucosa clear. Mouth with normal mucosa. Throat has no vesicles, no oropharyngeal exudates and uvula is midline.

## 2021-03-10 NOTE — ED PROVIDER NOTE - CLINICAL SUMMARY MEDICAL DECISION MAKING FREE TEXT BOX
69 y/o M pt presents to ED with urinary retention. Weir in place was flushed by nursing staff with no difficulty, and has output in Weir bag. Bedside US shows minimal residual, no urinary retention. Will check labs and instructions given for outpatient f/u as planned.

## 2021-03-10 NOTE — ED PROVIDER NOTE - OBJECTIVE STATEMENT
71 y/o M pt with PMHx of BPH, anxiety, and urinary retention, and PSHx of TURP with Weir in place presents to ED c/o urinary retention today. Pt saw his urologist today and had his Weir changed around 12PM, then came home at around 1PM and took an afternoon nap. When he woke up, he noted minimal outpatient in his Weir, got concerned for retention, and came to ED for evaluation. Pt denies abdominal pain, or any other acute complaints. Pt is anxious appearing in ED, but not in any distress.

## 2021-03-10 NOTE — ED ADULT NURSE REASSESSMENT NOTE - NS ED NURSE REASSESS COMMENT FT1
Pt agitated aggressive yelling at staff asking repetitive questions repeatedly instructed to have a seat on his stretcher and lower his voice. Pt states "I get very angry but I've never been violent in my life, I can tell you guys are afraid" again asked to have a seat on stretcher pt noncompliant and continues to ask repetitive questions

## 2021-03-11 PROBLEM — N40.0 BENIGN PROSTATIC HYPERPLASIA WITHOUT LOWER URINARY TRACT SYMPTOMS: Chronic | Status: ACTIVE | Noted: 2021-03-08

## 2021-03-11 PROBLEM — R33.9 RETENTION OF URINE, UNSPECIFIED: Chronic | Status: ACTIVE | Noted: 2021-03-08

## 2021-03-11 PROBLEM — N39.0 URINARY TRACT INFECTION, SITE NOT SPECIFIED: Chronic | Status: ACTIVE | Noted: 2021-03-08

## 2021-03-11 PROBLEM — F41.9 ANXIETY DISORDER, UNSPECIFIED: Chronic | Status: ACTIVE | Noted: 2021-03-08

## 2021-03-14 LAB
-  AMPICILLIN/SULBACTAM: SIGNIFICANT CHANGE UP
-  AMPICILLIN: SIGNIFICANT CHANGE UP
-  CEFAZOLIN: SIGNIFICANT CHANGE UP
-  CEFTRIAXONE: SIGNIFICANT CHANGE UP
-  CIPROFLOXACIN: SIGNIFICANT CHANGE UP
-  ERTAPENEM: SIGNIFICANT CHANGE UP
-  GENTAMICIN: SIGNIFICANT CHANGE UP
-  NITROFURANTOIN: SIGNIFICANT CHANGE UP
-  PIPERACILLIN/TAZOBACTAM: SIGNIFICANT CHANGE UP
-  TOBRAMYCIN: SIGNIFICANT CHANGE UP
-  TRIMETHOPRIM/SULFAMETHOXAZOLE: SIGNIFICANT CHANGE UP
METHOD TYPE: SIGNIFICANT CHANGE UP
SARS-COV-2 N GENE NPH QL NAA+PROBE: NOT DETECTED
SARS-COV-2 N GENE NPH QL NAA+PROBE: NOT DETECTED

## 2021-03-15 ENCOUNTER — TRANSCRIPTION ENCOUNTER (OUTPATIENT)
Age: 71
End: 2021-03-15

## 2021-03-15 LAB
-  AMPICILLIN: SIGNIFICANT CHANGE UP
-  CEFAZOLIN: SIGNIFICANT CHANGE UP
-  CIPROFLOXACIN: SIGNIFICANT CHANGE UP
-  LEVOFLOXACIN: SIGNIFICANT CHANGE UP
-  LINEZOLID: SIGNIFICANT CHANGE UP
-  LINEZOLID: SIGNIFICANT CHANGE UP
-  NITROFURANTOIN: SIGNIFICANT CHANGE UP
-  OXACILLIN: SIGNIFICANT CHANGE UP
-  RIFAMPIN: SIGNIFICANT CHANGE UP
-  TETRACYCLINE: SIGNIFICANT CHANGE UP
-  TRIMETHOPRIM/SULFAMETHOXAZOLE: SIGNIFICANT CHANGE UP
-  VANCOMYCIN: SIGNIFICANT CHANGE UP
-  VANCOMYCIN: SIGNIFICANT CHANGE UP
METHOD TYPE: SIGNIFICANT CHANGE UP
METHOD TYPE: SIGNIFICANT CHANGE UP

## 2021-03-16 ENCOUNTER — RESULT REVIEW (OUTPATIENT)
Age: 71
End: 2021-03-16

## 2021-03-16 ENCOUNTER — APPOINTMENT (OUTPATIENT)
Dept: UROLOGY | Facility: HOSPITAL | Age: 71
End: 2021-03-16

## 2021-03-16 ENCOUNTER — OUTPATIENT (OUTPATIENT)
Dept: INPATIENT UNIT | Facility: HOSPITAL | Age: 71
LOS: 1 days | Discharge: HOME | End: 2021-03-16
Payer: MEDICARE

## 2021-03-16 VITALS
OXYGEN SATURATION: 97 % | RESPIRATION RATE: 20 BRPM | HEART RATE: 92 BPM | DIASTOLIC BLOOD PRESSURE: 72 MMHG | SYSTOLIC BLOOD PRESSURE: 142 MMHG | TEMPERATURE: 97 F

## 2021-03-16 DIAGNOSIS — Z90.79 ACQUIRED ABSENCE OF OTHER GENITAL ORGAN(S): Chronic | ICD-10-CM

## 2021-03-16 LAB
-  DAPTOMYCIN: SIGNIFICANT CHANGE UP
BLD GP AB SCN SERPL QL: NEGATIVE — SIGNIFICANT CHANGE UP
CULTURE RESULTS: SIGNIFICANT CHANGE UP
METHOD TYPE: SIGNIFICANT CHANGE UP
ORGANISM # SPEC MICROSCOPIC CNT: SIGNIFICANT CHANGE UP
RH IG SCN BLD-IMP: POSITIVE — SIGNIFICANT CHANGE UP
SPECIMEN SOURCE: SIGNIFICANT CHANGE UP

## 2021-03-16 PROCEDURE — 87186 SC STD MICRODIL/AGAR DIL: CPT

## 2021-03-16 PROCEDURE — 52601 PROSTATECTOMY (TURP): CPT | Mod: 22

## 2021-03-16 PROCEDURE — 86850 RBC ANTIBODY SCREEN: CPT

## 2021-03-16 PROCEDURE — 86901 BLOOD TYPING SEROLOGIC RH(D): CPT

## 2021-03-16 PROCEDURE — 52648 LASER SURGERY OF PROSTATE: CPT

## 2021-03-16 PROCEDURE — 86900 BLOOD TYPING SEROLOGIC ABO: CPT

## 2021-03-16 PROCEDURE — 87086 URINE CULTURE/COLONY COUNT: CPT

## 2021-03-16 PROCEDURE — 88305 TISSUE EXAM BY PATHOLOGIST: CPT | Mod: 26

## 2021-03-16 PROCEDURE — 88305 TISSUE EXAM BY PATHOLOGIST: CPT

## 2021-03-16 PROCEDURE — C1889: CPT

## 2021-03-16 NOTE — PACU DISCHARGE NOTE - COMMENTS
pt met criteria for discharge- able to tolerate po intake w/o N/V- denies Pain- ambulating with stand by assist- Weir clear to blood tinged- MD Blackman assessed pt at bedside- pt will be D/C home with [place] with a follow up appointment at 9 am to remove Weir - Discharge instruction explained to pt -pt verbalizes understanding -pt left unit ambulating home accompanied by PCA pt met criteria for discharge- able to tolerate po intake w/o N/V- denies Pain- ambulating with stand by assist- Weir clear to blood tinged- MD Blackman assessed pt at bedside- pt will be D/C home with Weir with a follow up appointment at 9 am to remove Weir - Discharge instruction explained to pt -pt verbalizes understanding - Catheter teaching care provided and pt demonstrated back and verbalizes understanding pt left unit ambulating home accompanied by PCA

## 2021-03-16 NOTE — ED POST DISCHARGE NOTE - DETAILS
pt called back, reports he is on antibiotics from his doctor, refusing to tell this ED provider which abx, stating "my doctor gave them to me so what does it matter to you, I'm going to listen to him."  Pt agitated during call, I attempted to explain that I have the results of the sensitivities to make sure the antibiotic is appropriate, pt hung up the phone.

## 2021-03-16 NOTE — BRIEF OPERATIVE NOTE - NSICDXBRIEFPROCEDURE_GEN_ALL_CORE_FT
PROCEDURES:  Photoselective vaporization of prostate (PVP) with laser 16-Mar-2021 12:41:21  Marino Valdez

## 2021-03-17 ENCOUNTER — APPOINTMENT (OUTPATIENT)
Dept: UROLOGY | Facility: CLINIC | Age: 71
End: 2021-03-17
Payer: MEDICARE

## 2021-03-17 ENCOUNTER — EMERGENCY (EMERGENCY)
Facility: HOSPITAL | Age: 71
LOS: 1 days | Discharge: HOME | End: 2021-03-17
Attending: EMERGENCY MEDICINE | Admitting: EMERGENCY MEDICINE
Payer: MEDICARE

## 2021-03-17 ENCOUNTER — NON-APPOINTMENT (OUTPATIENT)
Age: 71
End: 2021-03-17

## 2021-03-17 VITALS
RESPIRATION RATE: 19 BRPM | DIASTOLIC BLOOD PRESSURE: 74 MMHG | OXYGEN SATURATION: 99 % | HEART RATE: 90 BPM | TEMPERATURE: 98 F | SYSTOLIC BLOOD PRESSURE: 128 MMHG

## 2021-03-17 VITALS
HEIGHT: 68 IN | OXYGEN SATURATION: 98 % | HEART RATE: 138 BPM | RESPIRATION RATE: 20 BRPM | TEMPERATURE: 98 F | DIASTOLIC BLOOD PRESSURE: 83 MMHG | SYSTOLIC BLOOD PRESSURE: 145 MMHG

## 2021-03-17 VITALS — TEMPERATURE: 98.2 F | DIASTOLIC BLOOD PRESSURE: 78 MMHG | HEART RATE: 118 BPM | SYSTOLIC BLOOD PRESSURE: 119 MMHG

## 2021-03-17 DIAGNOSIS — Z90.79 ACQUIRED ABSENCE OF OTHER GENITAL ORGAN(S): ICD-10-CM

## 2021-03-17 DIAGNOSIS — Z87.440 PERSONAL HISTORY OF URINARY (TRACT) INFECTIONS: ICD-10-CM

## 2021-03-17 DIAGNOSIS — Z79.899 OTHER LONG TERM (CURRENT) DRUG THERAPY: ICD-10-CM

## 2021-03-17 DIAGNOSIS — R33.8 OTHER RETENTION OF URINE: ICD-10-CM

## 2021-03-17 DIAGNOSIS — F41.9 ANXIETY DISORDER, UNSPECIFIED: ICD-10-CM

## 2021-03-17 DIAGNOSIS — N48.89 OTHER SPECIFIED DISORDERS OF PENIS: ICD-10-CM

## 2021-03-17 DIAGNOSIS — Z90.79 ACQUIRED ABSENCE OF OTHER GENITAL ORGAN(S): Chronic | ICD-10-CM

## 2021-03-17 DIAGNOSIS — N40.1 BENIGN PROSTATIC HYPERPLASIA WITH LOWER URINARY TRACT SYMPTOMS: ICD-10-CM

## 2021-03-17 PROCEDURE — 99284 EMERGENCY DEPT VISIT MOD MDM: CPT

## 2021-03-17 PROCEDURE — 99283 EMERGENCY DEPT VISIT LOW MDM: CPT

## 2021-03-17 PROCEDURE — 99024 POSTOP FOLLOW-UP VISIT: CPT

## 2021-03-17 PROCEDURE — 51700 IRRIGATION OF BLADDER: CPT

## 2021-03-17 NOTE — ED PROVIDER NOTE - CARE PROVIDER_API CALL
Buck Myers)  Urology  [address] [place] [address], [place] [address]  Phone: [phone]  Fax: [phone]  Follow Up Time:

## 2021-03-17 NOTE — ED PROVIDER NOTE - OBJECTIVE STATEMENT
s/p laser TURP yesterday (Dr Felipe); c/o pain in penis and blood leaking around catheter.  patient thinks catheter is too big.  last emptied bag apx 2 hrs earlier, was mostly blood with multiple medium sized blood clots; no significant urine draining into leg bag since then.  no abdominal pain or back pain.  no fever or chills.

## 2021-03-17 NOTE — ED PROVIDER NOTE - PATIENT PORTAL LINK FT
You can access the FollowMyHealth Patient Portal offered by [facility] by registering at the following website: [email or web address]. By joining [employer] [facility]’s FollowMyHealth portal, you will also be able to view your health information using other applications (apps) compatible with our system.

## 2021-03-17 NOTE — ED PROVIDER NOTE - PHYSICAL EXAMINATION
CONSTITUTIONAL: WD,WN. NAD but very anxious.  SKIN: Normal color and turgor. No rash.    HEAD: NC/AT.  EYES: Conjunctiva clear. EOMI. PERRL.    ENT: Airway patent, OP without erythema, tonsillar swelling or exudate; uvula midline without swelling. Nasal mucosa clear, no rhinorrhea.   RESPIRATORY:  Breathing non-labored. No retractions or accessory muscle use.  Lungs CTA bilat.  CARDIOVASCULAR:  RRR, S1S2. No M/R/G.      GI:  Abdomen soft, nontender.    : No significant distention of bladder appreciated. Weir in situ.  Blood leaking around Weir.  No testicular or penile swelling.  No CVAT.    MSK: Neck supple.  No lower extremity edema or calf tenderness.  No joint swelling or ROM limitation.  NEURO: Alert and oriented; CN II-XII grossly intact. Speech clear. 5/5 strength in all extremities.  Good balance. Steady gait.

## 2021-03-17 NOTE — ED ADULT NURSE NOTE - OBJECTIVE STATEMENT
pt here for urinary pain and blood in urine, reports he had a mackey inserted yesterday for a prostate procedure and now feels like the cath is too big, no blood thinners, HR noted elevated, pt denies any CP/SOB    had PVP performed with subsequent Mackey placement on 03/16/21

## 2021-03-17 NOTE — PHYSICAL EXAM
[General Appearance - Well Developed] : well developed [General Appearance - Well Nourished] : well nourished [Normal Appearance] : normal appearance [Well Groomed] : well groomed [General Appearance - In No Acute Distress] : no acute distress [Abdomen Soft] : soft [Abdomen Tenderness] : non-tender [Costovertebral Angle Tenderness] : no ~M costovertebral angle tenderness [Urethral Meatus] : meatus normal [Urinary Bladder Findings] : the bladder was normal on palpation [Scrotum] : the scrotum was normal [FreeTextEntry1] : 3 way catheter in place with bloody output [Edema] : no peripheral edema [] : no respiratory distress [Respiration, Rhythm And Depth] : normal respiratory rhythm and effort [Exaggerated Use Of Accessory Muscles For Inspiration] : no accessory muscle use [Oriented To Time, Place, And Person] : oriented to person, place, and time [Affect] : the affect was normal [Mood] : the mood was normal [Not Anxious] : not anxious [Normal Station and Gait] : the gait and station were normal for the patient's age [No Focal Deficits] : no focal deficits [No Palpable Adenopathy] : no palpable adenopathy

## 2021-03-17 NOTE — HISTORY OF PRESENT ILLNESS
[FreeTextEntry1] : HPI per Dr. Myers 1/15/21: 69 yo male with hx of BPH s/p TURP in 2014 presented to the [facility] ED on Monday (1/11) with difficulty voiding. He first started havign trouble over the previous weekend when he was only able to void small volumes and frequently. Urgent care treated him for possible UTI, but he was still unable to void fully so he finally presented tot he ED. He had a mackey catheter placed and discharged with tamsulosin.\par \par He notes that he voided without difficulty for the first 5 years after his TURP, but in the last year or so he has noticed increased difficulty voiding complaining of frequency and a slow stream. He denies dysuria or hematuria.\par \par Today he notes that the catheter is bothering him and that there is a lot of pressure in his penis. \par \par 2/5/21: Patient has failed subsequent void trials and remains catheter dependent. He presents today to discuss surgical options for treatment, in particular prostate enucleation given measured prostate volume of 90 cc. He currently remains bothered by indwelling catheter, denies fevers, chills, flank pain or hematuria.\par \par 2/12/21: Patient presents for UDS and cystoscopy. No changes to health in the interim. Remains catheter dependent. \par \par Cystoscopy demonstrated a very large prostatic regrowth with ball-valve appearance.\par     UDS demonstrates decreased capacity bladder with low contractility. I expected with\par     prostate debulking procedure, patient would very successfully be able to void. Without\par     prostate debulking procedure, patient will be catheter dependent for the rest of his\par     life. Patient has decided he would like to proceed with greenlight laser enucleation of\par     the prostate with morcellator under general anesthesia. His cystoscopy also\par     demonstrated concerning lesion on the left lateral wall.\par \par 3/16/21: s/p PVP\par \par 3/17/21: Patient presents s/p PVP. I had recommended enucleation procedure, however, patient adamant he wanted procedure done under spinal. Therefore, I performed PVP. He was discharged yesterday with catheter, but developed bladder spasms overnight and presented to ED. Catheter was assessed and found to be functional. Bladder was empty. He presents this morning for void trial. Urine remains bloody.

## 2021-03-17 NOTE — ED PROVIDER NOTE - ATTENDING CONTRIBUTION TO CARE
I discussed the plan of care of the patient directly with the PA while the patient was in the Emergency Department. VSS, pt uncomfortable but ambulating around ED without issue.  I have reviewed the ACP note and agree with the history, exam and plan of care.

## 2021-03-17 NOTE — ED ADULT TRIAGE NOTE - OTHER COMPLAINTS
pt here for urinary pain and blood in urine, reports he had a mackey inserted yesterday for a prostate procedure and now feels like the cath is too big, no blood thiners, HR noted elevated, pt denies any CP/SOB

## 2021-03-17 NOTE — ED PROVIDER NOTE - NSFOLLOWUPINSTRUCTIONS_ED_ALL_ED_FT
Follow up with Dr Myers as scheduled today.  Return to the Emergency Department if you have any new or worsening symptoms, or if you have any concerns.  =======================    Weir Catheter Placement and Care    WHAT YOU NEED TO KNOW:    A Weir catheter is a sterile tube that is inserted into your bladder to drain urine. It is also called an indwelling urinary catheter.     DISCHARGE INSTRUCTIONS:    Return to the emergency department if:   •Your catheter comes out.       •You suddenly have material that looks like sand in the tubing or drainage bag.      •No urine is draining into the bag and you have checked the system.      •You have pain in your hip, back, pelvis, or lower abdomen.      •You are confused or cannot think clearly.      Call your doctor or urologist if:   •You have a fever.      •You have bladder spasms for more than 1 day after the catheter is placed.      •You see blood in the tubing or drainage bag.      •You have a rash or itching where the catheter tube is secured to your skin.      •Urine leaks from or around the catheter, tubing, or drainage bag.      •The closed drainage system has accidently come open or apart.       •You see a layer of crystals inside the tubing.      •You have questions or concerns about your condition or care.      Care for your catheter and drainage bag: You can reduce your risk for infection and injury by caring for your catheter and drainage bag properly.  •Wash your hands often. Wash before and after you touch your catheter, tubing, or drainage bag. Use soap and water. Wear clean disposable gloves when you care for your catheter or disconnect the drainage bag. Wash your hands before you prepare or eat food.   Handwashing           •Clean your genital area 2 times every day. Clean your catheter area and anal opening after every bowel movement. ?For men: Use a soapy cloth to clean the tip of your penis. Start where the catheter enters. Wipe backward making sure to pull back the foreskin. Then use a cloth with clear water in the same direction to clean away the soap.       ?For women: Use a soapy cloth to clean the area that the catheter enters your body. Make sure to separate your labia and wipe toward the anus. Then use a cloth with clear water and wipe in the same direction.       •Secure the catheter tube so you do not pull or move the catheter. This helps prevent pain and bladder spasms. Healthcare providers will show you how to use medical tape or a strap to secure the catheter tube to your body.       •Keep a closed drainage system. Your catheter should always be attached to the drainage bag to form a closed system. Do not disconnect any part of the closed system unless you need to change the bag.      •Keep the drainage bag below the level of your waist. This helps stop urine from moving back up the tubing and into your bladder. Do not loop or kink the tubing. This can cause urine to back up and collect in your bladder. Do not let the drainage bag touch or lie on the floor.      •Empty the drainage bag when needed. The weight of a full drainage bag can be painful. Empty the drainage bag every 3 to 6 hours or when it is ? full.       •Clean and change the drainage bag as directed. Ask your healthcare provider how often you should change the drainage bag and what cleaning solution to use. Wear disposable gloves when you change the bag. Do not allow the end of the catheter or tubing to touch anything. Clean the ends with an alcohol pad before you reconnect them.      What to do if problems develop:   •No urine is draining into the bag: ?Check for kinks in the tubing and straighten them out.       ?Check the tape or strap used to secure the catheter tube to your skin. Make sure it is not blocking the tube.       ?Make sure you are not sitting or lying on the tubing.      ?Make sure the urine bag is hanging below the level of your waist.      •Urine leaks from or around the catheter, tubing, or drainage bag: Check if the closed drainage system has accidently come open or apart. Clean the catheter and tubing ends with a new alcohol pad and reconnect them.       Follow up with your doctor or urologist as directed: Write down your questions so you remember to ask them during your visits.

## 2021-03-17 NOTE — ED PROVIDER NOTE - PROGRESS NOTE DETAILS
Urology at bedside. Urology flushed catheter, clots evacuated and now flowing well, urine clearing up. Cleared for DC, has appointment with his urologist today.

## 2021-03-17 NOTE — ED ADULT NURSE REASSESSMENT NOTE - NS ED NURSE REASSESS COMMENT FT1
AALIYAH Potts at bedside for evaluation
AALIYAH Potts has evaluated pt., awaiting Urology eval., pt. aware, with understanding verbalized
Urology PA-Erik at bedside
Urology remains at bedside
pt. up ambulating about room, asking when urology is coming, reminded that we have contacted them, and they will be here asap, understanding verbalized
large amount of clots evacuated with manual irrigation per urology PA, pt. verbalizes immediate relief, urology remains at bedside, awaiting further orders

## 2021-03-17 NOTE — ED PROVIDER NOTE - CLINICAL SUMMARY MEDICAL DECISION MAKING FREE TEXT BOX
Suspect clot retention.  Does not appear toxic.  Mildly tachycardic.  NAD but very anxious.  Plan: Lidocaine UroJet applied, urology consult.

## 2021-03-18 LAB
-  AMPICILLIN/SULBACTAM: SIGNIFICANT CHANGE UP
-  AMPICILLIN: SIGNIFICANT CHANGE UP
-  CEFAZOLIN: SIGNIFICANT CHANGE UP
-  CEFEPIME: SIGNIFICANT CHANGE UP
-  CEFOXITIN: SIGNIFICANT CHANGE UP
-  CEFTRIAXONE: SIGNIFICANT CHANGE UP
-  CIPROFLOXACIN: SIGNIFICANT CHANGE UP
-  ERTAPENEM: SIGNIFICANT CHANGE UP
-  GENTAMICIN: SIGNIFICANT CHANGE UP
-  NITROFURANTOIN: SIGNIFICANT CHANGE UP
-  PIPERACILLIN/TAZOBACTAM: SIGNIFICANT CHANGE UP
-  TOBRAMYCIN: SIGNIFICANT CHANGE UP
-  TRIMETHOPRIM/SULFAMETHOXAZOLE: SIGNIFICANT CHANGE UP
CULTURE RESULTS: SIGNIFICANT CHANGE UP
METHOD TYPE: SIGNIFICANT CHANGE UP
ORGANISM # SPEC MICROSCOPIC CNT: SIGNIFICANT CHANGE UP
ORGANISM # SPEC MICROSCOPIC CNT: SIGNIFICANT CHANGE UP
SPECIMEN SOURCE: SIGNIFICANT CHANGE UP

## 2021-03-18 RX ORDER — CEPHALEXIN 500 MG/1
500 CAPSULE ORAL 3 TIMES DAILY
Qty: 21 | Refills: 0 | Status: DISCONTINUED | COMMUNITY
Start: 2021-02-02 | End: 2021-03-18

## 2021-03-18 RX ORDER — AMOXICILLIN AND CLAVULANATE POTASSIUM 875; 125 MG/1; MG/1
875-125 TABLET, COATED ORAL TWICE DAILY
Qty: 10 | Refills: 0 | Status: DISCONTINUED | COMMUNITY
Start: 2021-03-16 | End: 2021-03-18

## 2021-03-19 ENCOUNTER — APPOINTMENT (OUTPATIENT)
Dept: UROLOGY | Facility: CLINIC | Age: 71
End: 2021-03-19
Payer: MEDICARE

## 2021-03-19 VITALS — TEMPERATURE: 98.3 F

## 2021-03-19 PROCEDURE — 51798 US URINE CAPACITY MEASURE: CPT

## 2021-03-19 PROCEDURE — 99214 OFFICE O/P EST MOD 30 MIN: CPT

## 2021-03-19 NOTE — PHYSICAL EXAM
[General Appearance - Well Developed] : well developed [General Appearance - Well Nourished] : well nourished [Normal Appearance] : normal appearance [Well Groomed] : well groomed [General Appearance - In No Acute Distress] : no acute distress [Abdomen Soft] : soft [Abdomen Tenderness] : non-tender [Costovertebral Angle Tenderness] : no ~M costovertebral angle tenderness [Urethral Meatus] : meatus normal [Urinary Bladder Findings] : the bladder was normal on palpation [Scrotum] : the scrotum was normal [Edema] : no peripheral edema [] : no respiratory distress [Respiration, Rhythm And Depth] : normal respiratory rhythm and effort [Exaggerated Use Of Accessory Muscles For Inspiration] : no accessory muscle use [Oriented To Time, Place, And Person] : oriented to person, place, and time [Affect] : the affect was normal [Mood] : the mood was normal [Not Anxious] : not anxious [Normal Station and Gait] : the gait and station were normal for the patient's age [No Focal Deficits] : no focal deficits [No Palpable Adenopathy] : no palpable adenopathy [FreeTextEntry1] : 3 way catheter in place with light fruit punch output

## 2021-03-19 NOTE — HISTORY OF PRESENT ILLNESS
[FreeTextEntry1] : HPI per Dr. Myers 1/15/21: 69 yo male with hx of BPH s/p TURP in 2014 presented to the [facility] ED on Monday (1/11) with difficulty voiding. He first started havign trouble over the previous weekend when he was only able to void small volumes and frequently. Urgent care treated him for possible UTI, but he was still unable to void fully so he finally presented tot he ED. He had a mackey catheter placed and discharged with tamsulosin.\par \par He notes that he voided without difficulty for the first 5 years after his TURP, but in the last year or so he has noticed increased difficulty voiding complaining of frequency and a slow stream. He denies dysuria or hematuria.\par \par Today he notes that the catheter is bothering him and that there is a lot of pressure in his penis. \par \par 2/5/21: Patient has failed subsequent void trials and remains catheter dependent. He presents today to discuss surgical options for treatment, in particular prostate enucleation given measured prostate volume of 90 cc. He currently remains bothered by indwelling catheter, denies fevers, chills, flank pain or hematuria.\par \par 2/12/21: Patient presents for UDS and cystoscopy. No changes to health in the interim. Remains catheter dependent. \par \par Cystoscopy demonstrated a very large prostatic regrowth with ball-valve appearance.\par     UDS demonstrates decreased capacity bladder with low contractility. I expected with\par     prostate debulking procedure, patient would very successfully be able to void. Without\par     prostate debulking procedure, patient will be catheter dependent for the rest of his\par     life. Patient has decided he would like to proceed with greenlight laser enucleation of\par     the prostate with morcellator under general anesthesia. His cystoscopy also\par     demonstrated concerning lesion on the left lateral wall.\par \par 3/16/21: s/p PVP\par \par 3/17/21: Patient presents s/p PVP. I had recommended enucleation procedure, however, patient adamant he wanted procedure done under spinal. Therefore, I performed PVP. He was discharged yesterday with catheter, but developed bladder spasms overnight and presented to ED. Catheter was assessed and found to be functional. Bladder was empty. He presents this morning for void trial. Urine remains bloody.\par \par 3/19/21: patient presents for void trial. Urine has remained clear, ranging from oscar to fruit punch without clots. Catheter removed today and patient able to void twice with low PVRs. He has not begun cefpodoxime antibiotics as recommended, but plans to  today.

## 2021-03-29 ENCOUNTER — NON-APPOINTMENT (OUTPATIENT)
Age: 71
End: 2021-03-29

## 2021-03-29 LAB — SURGICAL PATHOLOGY STUDY: SIGNIFICANT CHANGE UP

## 2021-04-13 ENCOUNTER — APPOINTMENT (OUTPATIENT)
Dept: INTERNAL MEDICINE | Facility: CLINIC | Age: 71
End: 2021-04-13

## 2021-04-23 ENCOUNTER — APPOINTMENT (OUTPATIENT)
Dept: UROLOGY | Facility: CLINIC | Age: 71
End: 2021-04-23

## 2021-04-27 ENCOUNTER — APPOINTMENT (OUTPATIENT)
Dept: UROLOGY | Facility: CLINIC | Age: 71
End: 2021-04-27
Payer: MEDICARE

## 2021-04-27 VITALS — HEART RATE: 108 BPM | TEMPERATURE: 97.2 F | SYSTOLIC BLOOD PRESSURE: 134 MMHG | DIASTOLIC BLOOD PRESSURE: 83 MMHG

## 2021-04-27 DIAGNOSIS — R33.9 RETENTION OF URINE, UNSPECIFIED: ICD-10-CM

## 2021-04-27 PROCEDURE — 51798 US URINE CAPACITY MEASURE: CPT

## 2021-04-27 PROCEDURE — 99024 POSTOP FOLLOW-UP VISIT: CPT

## 2021-04-27 NOTE — HISTORY OF PRESENT ILLNESS
[FreeTextEntry1] : HPI per Dr. Myers 1/15/21: 71 yo male with hx of BPH s/p TURP in 2014 presented to the [facility] ED on Monday (1/11) with difficulty voiding. He first started havign trouble over the previous weekend when he was only able to void small volumes and frequently. Urgent care treated him for possible UTI, but he was still unable to void fully so he finally presented tot he ED. He had a mackey catheter placed and discharged with tamsulosin.\par \par He notes that he voided without difficulty for the first 5 years after his TURP, but in the last year or so he has noticed increased difficulty voiding complaining of frequency and a slow stream. He denies dysuria or hematuria.\par \par Today he notes that the catheter is bothering him and that there is a lot of pressure in his penis. \par \par 2/5/21: Patient has failed subsequent void trials and remains catheter dependent. He presents today to discuss surgical options for treatment, in particular prostate enucleation given measured prostate volume of 90 cc. He currently remains bothered by indwelling catheter, denies fevers, chills, flank pain or hematuria.\par \par 2/12/21: Patient presents for UDS and cystoscopy. No changes to health in the interim. Remains catheter dependent. \par \par Cystoscopy demonstrated a very large prostatic regrowth with ball-valve appearance.\par     UDS demonstrates decreased capacity bladder with low contractility. I expected with\par     prostate debulking procedure, patient would very successfully be able to void. Without\par     prostate debulking procedure, patient will be catheter dependent for the rest of his\par     life. Patient has decided he would like to proceed with greenlight laser enucleation of\par     the prostate with morcellator under general anesthesia. His cystoscopy also\par     demonstrated concerning lesion on the left lateral wall.\par \par 3/16/21: s/p PVP\par \par 3/17/21: Patient presents s/p PVP. I had recommended enucleation procedure, however, patient adamant he wanted procedure done under spinal. Therefore, I performed PVP. He was discharged yesterday with catheter, but developed bladder spasms overnight and presented to ED. Catheter was assessed and found to be functional. Bladder was empty. He presents this morning for void trial. Urine remains bloody.\par \par 3/19/21: patient presents for void trial. Urine has remained clear, ranging from oscar to fruit punch without clots. Catheter removed today and patient able to void twice with low PVRs. He has not begun cefpodoxime antibiotics as recommended, but plans to  today.\par \par 4/27/21: Patient presents for follow up. He has been voiding well though he occasionally feels like his stream is weaker when he tries to urinate with low volumes. When his bladder is full, he voids standing up with a strong stream. His urine is clear. PVR is 80 today, though he voided prior to coming to the office. No leakage of urine.

## 2021-04-27 NOTE — PHYSICAL EXAM
[General Appearance - Well Developed] : well developed [General Appearance - Well Nourished] : well nourished [Normal Appearance] : normal appearance [Well Groomed] : well groomed [General Appearance - In No Acute Distress] : no acute distress [Abdomen Soft] : soft [Abdomen Tenderness] : non-tender [Costovertebral Angle Tenderness] : no ~M costovertebral angle tenderness [FreeTextEntry1] : Deferred [Edema] : no peripheral edema [] : no respiratory distress [Respiration, Rhythm And Depth] : normal respiratory rhythm and effort [Exaggerated Use Of Accessory Muscles For Inspiration] : no accessory muscle use [Oriented To Time, Place, And Person] : oriented to person, place, and time [Affect] : the affect was normal [Mood] : the mood was normal [Not Anxious] : not anxious [Normal Station and Gait] : the gait and station were normal for the patient's age [No Focal Deficits] : no focal deficits [No Palpable Adenopathy] : no palpable adenopathy

## 2021-04-30 ENCOUNTER — APPOINTMENT (OUTPATIENT)
Dept: UROLOGY | Facility: CLINIC | Age: 71
End: 2021-04-30

## 2021-07-28 ENCOUNTER — APPOINTMENT (OUTPATIENT)
Dept: UROLOGY | Facility: CLINIC | Age: 71
End: 2021-07-28
Payer: MEDICARE

## 2021-07-28 PROCEDURE — 51798 US URINE CAPACITY MEASURE: CPT

## 2021-07-28 PROCEDURE — 99214 OFFICE O/P EST MOD 30 MIN: CPT

## 2021-07-28 NOTE — PHYSICAL EXAM
[General Appearance - Well Developed] : well developed [General Appearance - Well Nourished] : well nourished [Normal Appearance] : normal appearance [Well Groomed] : well groomed [General Appearance - In No Acute Distress] : no acute distress [Abdomen Soft] : soft [Abdomen Tenderness] : non-tender [Costovertebral Angle Tenderness] : no ~M costovertebral angle tenderness [Edema] : no peripheral edema [] : no respiratory distress [Respiration, Rhythm And Depth] : normal respiratory rhythm and effort [Exaggerated Use Of Accessory Muscles For Inspiration] : no accessory muscle use [Oriented To Time, Place, And Person] : oriented to person, place, and time [Affect] : the affect was normal [Mood] : the mood was normal [Not Anxious] : not anxious [Normal Station and Gait] : the gait and station were normal for the patient's age [No Focal Deficits] : no focal deficits [FreeTextEntry1] : Deferred

## 2021-07-28 NOTE — HISTORY OF PRESENT ILLNESS
[FreeTextEntry1] : HPI per Dr. Myers 1/15/21: 69 yo male with hx of BPH s/p TURP in 2014 presented to the [facility] ED on Monday (1/11) with difficulty voiding. He first started havign trouble over the previous weekend when he was only able to void small volumes and frequently. Urgent care treated him for possible UTI, but he was still unable to void fully so he finally presented tot he ED. He had a mackey catheter placed and discharged with tamsulosin.\par \par He notes that he voided without difficulty for the first 5 years after his TURP, but in the last year or so he has noticed increased difficulty voiding complaining of frequency and a slow stream. He denies dysuria or hematuria.\par \par Today he notes that the catheter is bothering him and that there is a lot of pressure in his penis. \par \par 2/5/21: Patient has failed subsequent void trials and remains catheter dependent. He presents today to discuss surgical options for treatment, in particular prostate enucleation given measured prostate volume of 90 cc. He currently remains bothered by indwelling catheter, denies fevers, chills, flank pain or hematuria.\par \par 2/12/21: Patient presents for UDS and cystoscopy. No changes to health in the interim. Remains catheter dependent. \par \par Cystoscopy demonstrated a very large prostatic regrowth with ball-valve appearance.\par     UDS demonstrates decreased capacity bladder with low contractility. I expected with\par     prostate debulking procedure, patient would very successfully be able to void. Without\par     prostate debulking procedure, patient will be catheter dependent for the rest of his\par     life. Patient has decided he would like to proceed with greenlight laser enucleation of\par     the prostate with morcellator under general anesthesia. His cystoscopy also\par     demonstrated concerning lesion on the left lateral wall.\par \par 3/16/21: s/p PVP\par \par 3/17/21: Patient presents s/p PVP. I had recommended enucleation procedure, however, patient adamant he wanted procedure done under spinal. Therefore, I performed PVP. He was discharged yesterday with catheter, but developed bladder spasms overnight and presented to ED. Catheter was assessed and found to be functional. Bladder was empty. He presents this morning for void trial. Urine remains bloody.\par \par 3/19/21: patient presents for void trial. Urine has remained clear, ranging from oscar to fruit punch without clots. Catheter removed today and patient able to void twice with low PVRs. He has not begun cefpodoxime antibiotics as recommended, but plans to  today.\par \par 4/27/21: Patient presents for follow up. He has been voiding well though he occasionally feels like his stream is weaker when he tries to urinate with low volumes. When his bladder is full, he voids standing up with a strong stream. His urine is clear. PVR is 80 today, though he voided prior to coming to the office. No leakage of urine.\par \par 7/28/21:  Patient presents for follow up. He has been voiding well though he occasionally feels like his stream is weaker when he tries to urinate with low volumes. When his bladder is full, he voids standing up with a strong stream. His urine is clear. PVR is 78 cc today, though he voided prior to coming to the office. No leakage of urine. He gets very anxious.

## 2021-07-28 NOTE — ASSESSMENT
[FreeTextEntry1] : Mr. PEYTON HAQUE is a 70 year year old man with urinary retention, BPH and 90 cc prostate despite prior TURP not responding to medications. He underwent PVP at [facility] that was uncomplicated. He is now voiding well with low PVR. Overall, he is satisfied with procedure. His anxiety causes him to feel that he is in retention if he cannot urinate when he wants to urinate. I reassured him that it is normal to not be able to urinate if his bladder is empty and that he should wait until he has an urge to urinate before voiding.\par      - F/U as scheduled in October\par      - Plan for PSA\par

## 2021-08-02 ENCOUNTER — APPOINTMENT (OUTPATIENT)
Dept: INTERNAL MEDICINE | Facility: CLINIC | Age: 71
End: 2021-08-02

## 2021-09-02 ENCOUNTER — APPOINTMENT (OUTPATIENT)
Dept: INTERNAL MEDICINE | Facility: CLINIC | Age: 71
End: 2021-09-02
Payer: MEDICARE

## 2021-09-02 VITALS
OXYGEN SATURATION: 97 % | HEART RATE: 93 BPM | SYSTOLIC BLOOD PRESSURE: 159 MMHG | DIASTOLIC BLOOD PRESSURE: 100 MMHG | TEMPERATURE: 97.2 F | RESPIRATION RATE: 16 BRPM | BODY MASS INDEX: 29.35 KG/M2 | WEIGHT: 193 LBS

## 2021-09-02 VITALS — DIASTOLIC BLOOD PRESSURE: 90 MMHG | SYSTOLIC BLOOD PRESSURE: 143 MMHG

## 2021-09-02 PROCEDURE — 99214 OFFICE O/P EST MOD 30 MIN: CPT

## 2021-09-02 NOTE — ASSESSMENT
[FreeTextEntry1] : 71M PMHx BPH s/p TURP 2014 c/b urinary retention s/p laser vaporization of prostate 3/2021, HTN, depression, anxiety, insomnia, migraines who presents for follow-up on his blood pressure.\par \par #HTN\par - /100 initially today, then improved to 140/90 after 20 minutes of sitting\par - Exacerbated by CECILIA and white-coat HTN\par - Start amlodipine 5mg daily\par - Advised to discontinue any OTC bp meds\par - Advised to purchase an upper arm bp cuff for home monitoring, since his wrist bp cuff is inaccurate\par - Counseled on weight loss through improved diet and continued exercise\par - Reassess response to amlodipine at follow-up appt in 6 weeks\par \par #Anxiety/Depression\par - Sees therapist weekly\par - Resistant to trying medications, but will re-discuss next visit\par \par #BPH\par - No urinary symptoms at this time\par - Patient has anxiety that his prostate could enlarge again\par - Next urology appointment next month\par \par #HCM\par - COVID vaccine J&J on April 2\par - Has not had colonoscopy\par - Never had shingles vax

## 2021-09-02 NOTE — REVIEW OF SYSTEMS
[Insomnia] : insomnia [Anxiety] : anxiety [Depression] : depression [Fever] : no fever [Chills] : no chills [Fatigue] : no fatigue [Night Sweats] : no night sweats [Discharge] : no discharge [Vision Problems] : no vision problems [Chest Pain] : no chest pain [Palpitations] : no palpitations [Shortness Of Breath] : no shortness of breath [Wheezing] : no wheezing [Cough] : no cough [Abdominal Pain] : no abdominal pain [Nausea] : no nausea [Vomiting] : no vomiting [Dysuria] : no dysuria [Incontinence] : no incontinence [Hematuria] : no hematuria [Dizziness] : no dizziness [Suicidal] : not suicidal

## 2021-09-02 NOTE — END OF VISIT
[FreeTextEntry3] : 72 yo male with hx BPH s/p TURP 2014, c/b urinary retention, s/p laser vaporization of prostate march 2021, HTN, migraines, anxiety, here for f/u\par \par 1) HTN - chronic, above goal, possibly anxiety/white coat component however also elevated at home.  will start amlodipine 5mg.  EKG last visit normal sinus.  f/u 6 weeks\par 2) BPH - following closely with uro, on tamsulosin

## 2021-09-02 NOTE — HISTORY OF PRESENT ILLNESS
[FreeTextEntry1] : High blood pressure [de-identified] : 71M PMHx BPH s/p TURP 2014 c/b urinary retention s/p laser vaporization of prostate 3/2021, HTN, depression, anxiety, insomnia, migraines who presents for follow-up on his blood pressure. He has been checking his blood pressure at home and it typically measures sbp 130s-170s, although his wrist bp cuff has questionable accuracy. He has tried OTC medicine for blood pressure but they have not helped. Takes no prescription meds at home. Does not smoke or drink alcohol. He exercises everyday on his home treadmill and free weights. His anxiety is primarily due to fear that his prostate might become enlarged again and prevent him from urinating. He feels depressed about how all of his family members have passed away and he is  and lonely. He sees a therapist weekly but is resistant to anti-depression or anti-anxiety medications. His anxiety causes insomnia as well, takes no sleep meds. His migraines occur once every 2 months and have been ongoing since he was 9 years old. He has never taken any medications for them. They are relieved by sitting in a dark quiet room. Denies blurry vision, tension headaches, sore throat, cough, CP, SOB, abd pain, difficulty urinating, n/v/d/c.

## 2021-09-02 NOTE — PHYSICAL EXAM
[Well Nourished] : well nourished [Well Developed] : well developed [Well-Appearing] : well-appearing [Soft] : abdomen soft [Non Tender] : non-tender [Non-distended] : non-distended [Normal] : normal gait, coordination grossly intact, no focal deficits and deep tendon reflexes were 2+ and symmetric [de-identified] : Anxious-appearing man

## 2021-10-05 ENCOUNTER — APPOINTMENT (OUTPATIENT)
Dept: UROLOGY | Facility: CLINIC | Age: 71
End: 2021-10-05

## 2021-10-26 ENCOUNTER — APPOINTMENT (OUTPATIENT)
Dept: INTERNAL MEDICINE | Facility: CLINIC | Age: 71
End: 2021-10-26

## 2021-11-01 ENCOUNTER — APPOINTMENT (OUTPATIENT)
Dept: INTERNAL MEDICINE | Facility: CLINIC | Age: 71
End: 2021-11-01

## 2021-11-29 ENCOUNTER — RX RENEWAL (OUTPATIENT)
Age: 71
End: 2021-11-29

## 2021-12-06 ENCOUNTER — APPOINTMENT (OUTPATIENT)
Dept: INTERNAL MEDICINE | Facility: CLINIC | Age: 71
End: 2021-12-06

## 2021-12-17 ENCOUNTER — APPOINTMENT (OUTPATIENT)
Dept: INTERNAL MEDICINE | Facility: CLINIC | Age: 71
End: 2021-12-17
Payer: MEDICARE

## 2021-12-17 VITALS
OXYGEN SATURATION: 98 % | BODY MASS INDEX: 28.79 KG/M2 | WEIGHT: 190 LBS | SYSTOLIC BLOOD PRESSURE: 179 MMHG | DIASTOLIC BLOOD PRESSURE: 88 MMHG | HEART RATE: 95 BPM | HEIGHT: 68 IN | TEMPERATURE: 97.9 F

## 2021-12-17 DIAGNOSIS — I10 ESSENTIAL (PRIMARY) HYPERTENSION: ICD-10-CM

## 2021-12-17 PROCEDURE — 99214 OFFICE O/P EST MOD 30 MIN: CPT | Mod: GC

## 2021-12-17 RX ORDER — CEFPODOXIME PROXETIL 200 MG/1
200 TABLET, FILM COATED ORAL
Qty: 20 | Refills: 0 | Status: DISCONTINUED | COMMUNITY
Start: 2021-03-18 | End: 2021-12-17

## 2021-12-17 RX ORDER — TAMSULOSIN HYDROCHLORIDE 0.4 MG/1
0.4 CAPSULE ORAL
Qty: 30 | Refills: 3 | Status: DISCONTINUED | COMMUNITY
Start: 2021-01-15 | End: 2021-12-17

## 2021-12-29 ENCOUNTER — RX RENEWAL (OUTPATIENT)
Age: 71
End: 2021-12-29

## 2022-02-11 ENCOUNTER — APPOINTMENT (OUTPATIENT)
Dept: INTERNAL MEDICINE | Facility: CLINIC | Age: 72
End: 2022-02-11
Payer: MEDICARE

## 2022-02-11 ENCOUNTER — APPOINTMENT (OUTPATIENT)
Dept: INTERNAL MEDICINE | Facility: CLINIC | Age: 72
End: 2022-02-11

## 2022-02-11 DIAGNOSIS — G47.00 INSOMNIA, UNSPECIFIED: ICD-10-CM

## 2022-02-11 PROCEDURE — 99443: CPT | Mod: GC,95

## 2022-02-14 PROBLEM — G47.00 INSOMNIA: Status: ACTIVE | Noted: 2021-03-01

## 2022-03-11 ENCOUNTER — RX RENEWAL (OUTPATIENT)
Age: 72
End: 2022-03-11

## 2022-04-08 ENCOUNTER — APPOINTMENT (OUTPATIENT)
Dept: INTERNAL MEDICINE | Facility: CLINIC | Age: 72
End: 2022-04-08

## 2022-04-11 ENCOUNTER — RX RENEWAL (OUTPATIENT)
Age: 72
End: 2022-04-11

## 2022-04-13 ENCOUNTER — APPOINTMENT (OUTPATIENT)
Dept: INTERNAL MEDICINE | Facility: CLINIC | Age: 72
End: 2022-04-13
Payer: MEDICARE

## 2022-04-13 PROCEDURE — 99443: CPT | Mod: GC,95

## 2022-04-13 NOTE — PLAN
[FreeTextEntry1] : 71M w/ PMHx HTN, depression/anxiety, insomnia, migraines, and BPH s/p TURP in 2014 and laser vaporization of prostate in March 2021 presents for fluctuating BP readings after self-discontinuation of BP medication. \par \par #HTN\par - counselled on proper BP taking techniques\par - counselled on side effects of medications and discussed risk/benefit. Patient amenable to restarting lisinopril 10mg qd, he will come in for a BMP check next week and official in-person appt in 6 weeks. \par - c/w amlodipine 10mg qd, lisinopril 10mg qd\par \par RTC in 6 weeks as above

## 2022-04-13 NOTE — END OF VISIT
[] : Resident [FreeTextEntry3] : \par 72 yo m w/ h/o htn, depression, anxiety, insomnia, migraine here for htn\par \par #HTN- home wrist monitoer - home bp 120-140s- on just amlodipine. t/c 24 hr bp monitor. needs to come to office next week for bmp for lisinopril and he wants to see resident pcp in 6 weeks for bp check\par \par  [Time Spent: ___ minutes] : I have spent [unfilled] minutes of time on the encounter.

## 2022-04-13 NOTE — HISTORY OF PRESENT ILLNESS
[FreeTextEntry1] : htn [de-identified] : [PEYTON HAQUE], an established patient at this office, reached out to this provider for telephonic visit. No recent visit with the last 7 days. A visit is not scheduled within the next 7 days at this time.\par Discussed with patient: You have chosen to receive care through the use of tele-media. Tele-media enables health care\par providers at different locations to provide safe, effective and convenient care through the use of technology. Please note that\par this is a billable encounter. As with any health care service, there are risks associated with the use of tele-media, including\par equipment failure, poor image and/or resolution, and  issues. You understand that I cannot physically\par examine you and that you may need to come to the clinic to complete the assessment. Patient agreed verbally to\par understanding the risks and benefits of tele-media as explained. All questions regarding tele-media encounters were\par answered.\par Total time spent with the patient on the phone was  minutes.\par \par 71M w/ PMHx HTN, depression/anxiety, insomnia, migraines, and BPH s/p TURP in 2014 and laser vaporization of prostate in March 2021 presents for complaints of fluctuating BP readings at home. Patient has been on amlodipine and was recently prescribed lisinopril which he stopped taking recently as he read an article online stating that it "counteracts with amlodipine". States his home sBP readings fluctuate from 120-145. He checks it 3 different times in the day religiously. He states that it causes him anxiety when he sees high readings, denies headaches, vision changes.

## 2022-04-15 ENCOUNTER — NON-APPOINTMENT (OUTPATIENT)
Age: 72
End: 2022-04-15

## 2022-04-20 ENCOUNTER — APPOINTMENT (OUTPATIENT)
Dept: INTERNAL MEDICINE | Facility: CLINIC | Age: 72
End: 2022-04-20
Payer: MEDICARE

## 2022-04-20 VITALS
DIASTOLIC BLOOD PRESSURE: 85 MMHG | OXYGEN SATURATION: 98 % | HEART RATE: 114 BPM | BODY MASS INDEX: 29.25 KG/M2 | WEIGHT: 193 LBS | SYSTOLIC BLOOD PRESSURE: 140 MMHG | HEIGHT: 68 IN | RESPIRATION RATE: 16 BRPM | TEMPERATURE: 97.7 F

## 2022-04-20 DIAGNOSIS — N13.8 BENIGN PROSTATIC HYPERPLASIA WITH LOWER URINARY TRACT SYMPMS: ICD-10-CM

## 2022-04-20 DIAGNOSIS — N40.1 BENIGN PROSTATIC HYPERPLASIA WITH LOWER URINARY TRACT SYMPMS: ICD-10-CM

## 2022-04-20 DIAGNOSIS — N17.9 ACUTE KIDNEY FAILURE, UNSPECIFIED: ICD-10-CM

## 2022-04-20 PROCEDURE — 36415 COLL VENOUS BLD VENIPUNCTURE: CPT

## 2022-04-20 PROCEDURE — 99214 OFFICE O/P EST MOD 30 MIN: CPT | Mod: 25,GC

## 2022-04-20 NOTE — PLAN
[FreeTextEntry1] : #Anxiety/depression. Patient has history of anxiety/depression. He denies feeling loss of interest or depressed at this time. He states that his anxiety is overwhelming and centers on his kidney function and varying blood pressures. CECILIA-7 today was 19. He speaks to a therapist over the phone. Denies SI thoughts or intention. \par - advised to stop wearing blood pressure monitor as source of anxiety \par - advised to stop taking all supplements \par - given psychiatry referral, but patient seems very hesistant to make appointment \par - started escitalopram 5 mg daily with goal to see in 2-4 weeks to uptitrate PRN \par \par #HTN. Presents with elevated bp, mechanical reading 140/85. Patient wears a wrist monitor at home that gives him varying results. Etiology for elevated BP most likely in light of essential hypertension compounded by underlying anxiety. \par - advised to stop wearing blood pressure monitor as source of anxiety \par - c/w amlodipine 10 mg daily \par - c/w lisinopril 10 mg daily \par - will obtain cbc, cmp, tsh, UA, urine protein-creatinine ratio today \par \par #Tachycardia. Patient presents extremely worried and agitated this afternoon. States that this is a little worse about his baseline. He states that his anxiety has been interfering with his life profoundly. Seeks therapist. \par - advised to stop using all supplements \par - patient states to not take any stimulants at home, no need to screen for drugs for now \par - advised to start escitalopram 5 mg daily \par - RTC in 2-4 weeks \par - TSH today \par - psych referral given \par \par RTC in 2-4 weeks \par

## 2022-04-20 NOTE — ASSESSMENT
[FreeTextEntry1] : 71M w/ PMHx HTN, depression/anxiety, insomnia, migraines, and BPH s/p TURP in 2014 and laser vaporization of prostate in March 2021 presents anxious and agitated complaining of variable BPs at home.

## 2022-04-20 NOTE — END OF VISIT
[] : Resident [FreeTextEntry3] :  71 year old M presenting for follow up of blood pressure. Reports significant anxiety. Speaks with a therapist regularly by phone, does not have a psychiatrist or take medication for anxiety. Feels particularly anxious about his health. Has a watch that records his heart rate, notes that he experiences significant anxiety when the pulse readings appear high. Also notes that blood pressure fluctuates at home. Has read online about lisinopril and amlodipine, has concerns about them for his health. Reports significant concern over his kidney function, specifically the reduction in eGFR on his labs from last March (slight TWILA at that time in the setting of acute urinary retention following prostate surgery). On exam, pleasant but very anxious elderly male, walking around room, pressured speech, perseverative. Provided reassurance, encouraged pt not to wear watch w/ heart monitor because it exacerbates his anxiety. Will start low dose SSRI, f/u with therapist. Will check labs today including renal function. Discussed antihypertensives at length. RTC 2-4 weeks for f/u above or earlier prn.

## 2022-04-20 NOTE — PHYSICAL EXAM
[de-identified] : profoundly agitated, hands noted to be shaking  [de-identified] : tachycardic [de-identified] : Patient is agitated and shaking, speech noted to be mildly pressured; he is fixated on his kidney funciton and elevated blood pressures

## 2022-04-20 NOTE — HISTORY OF PRESENT ILLNESS
[FreeTextEntry1] : agitated, worried about blood pressure  [de-identified] : 71M w/ PMHx HTN, depression/anxiety, insomnia, migraines, and BPH s/p TURP in 2014 and laser vaporization of prostate in March 2021 presents anxious and agitated complaining of variable BPs at home. Patient has been feeling very anxious recently, and has been worrying about his kidney function. He is worried that the lisinopril he was prescribed may be causing him harm. At home, he uses a wrist monitor to measure his blood pressure, and he states that the values it records vary signfiicantly. Finally, the patient also states to buy over the counter supplements (cannot remember the names). On ROS, the patient denies headaches, vision changes, sob, cp, palpitations, n/v/d/c, fever/chills, dysuria, urinary frequency, hematuria. \par

## 2022-04-22 LAB
ALBUMIN SERPL ELPH-MCNC: 4.8 G/DL
ALP BLD-CCNC: 104 U/L
ALT SERPL-CCNC: 16 U/L
ANION GAP SERPL CALC-SCNC: 13 MMOL/L
AST SERPL-CCNC: 17 U/L
BASOPHILS # BLD AUTO: 0.03 K/UL
BASOPHILS NFR BLD AUTO: 0.3 %
BILIRUB SERPL-MCNC: 0.9 MG/DL
BUN SERPL-MCNC: 20 MG/DL
CALCIUM SERPL-MCNC: 9.8 MG/DL
CHLORIDE SERPL-SCNC: 101 MMOL/L
CO2 SERPL-SCNC: 25 MMOL/L
CREAT SERPL-MCNC: 1.07 MG/DL
EGFR: 75 ML/MIN/1.73M2
EOSINOPHIL # BLD AUTO: 0.17 K/UL
EOSINOPHIL NFR BLD AUTO: 1.6 %
GLUCOSE SERPL-MCNC: 119 MG/DL
HCT VFR BLD CALC: 50.3 %
HGB BLD-MCNC: 15.4 G/DL
IMM GRANULOCYTES NFR BLD AUTO: 0.5 %
LYMPHOCYTES # BLD AUTO: 2.24 K/UL
LYMPHOCYTES NFR BLD AUTO: 21.4 %
MAN DIFF?: NORMAL
MCHC RBC-ENTMCNC: 27 PG
MCHC RBC-ENTMCNC: 30.6 GM/DL
MCV RBC AUTO: 88.1 FL
MONOCYTES # BLD AUTO: 0.83 K/UL
MONOCYTES NFR BLD AUTO: 7.9 %
NEUTROPHILS # BLD AUTO: 7.13 K/UL
NEUTROPHILS NFR BLD AUTO: 68.3 %
PLATELET # BLD AUTO: 211 K/UL
POTASSIUM SERPL-SCNC: 4 MMOL/L
PROT SERPL-MCNC: 7.5 G/DL
RBC # BLD: 5.71 M/UL
RBC # FLD: 13.2 %
SODIUM SERPL-SCNC: 139 MMOL/L
WBC # FLD AUTO: 10.45 K/UL

## 2022-05-18 ENCOUNTER — RX RENEWAL (OUTPATIENT)
Age: 72
End: 2022-05-18

## 2022-05-31 ENCOUNTER — APPOINTMENT (OUTPATIENT)
Dept: INTERNAL MEDICINE | Facility: CLINIC | Age: 72
End: 2022-05-31

## 2022-06-07 ENCOUNTER — APPOINTMENT (OUTPATIENT)
Dept: INTERNAL MEDICINE | Facility: CLINIC | Age: 72
End: 2022-06-07

## 2022-06-10 ENCOUNTER — APPOINTMENT (OUTPATIENT)
Dept: INTERNAL MEDICINE | Facility: CLINIC | Age: 72
End: 2022-06-10

## 2022-06-14 ENCOUNTER — RX RENEWAL (OUTPATIENT)
Age: 72
End: 2022-06-14

## 2022-06-14 RX ORDER — AMLODIPINE BESYLATE 10 MG/1
10 TABLET ORAL DAILY
Qty: 30 | Refills: 3 | Status: ACTIVE | COMMUNITY
Start: 2021-09-02 | End: 1900-01-01

## 2022-06-15 ENCOUNTER — APPOINTMENT (OUTPATIENT)
Dept: INTERNAL MEDICINE | Facility: CLINIC | Age: 72
End: 2022-06-15
Payer: MEDICARE

## 2022-06-15 ENCOUNTER — APPOINTMENT (OUTPATIENT)
Dept: INTERNAL MEDICINE | Facility: CLINIC | Age: 72
End: 2022-06-15

## 2022-06-15 PROCEDURE — ZZZZZ: CPT

## 2022-06-15 NOTE — REVIEW OF SYSTEMS
[Fever] : no fever [Chills] : no chills [Chest Pain] : no chest pain [Palpitations] : no palpitations [Shortness Of Breath] : no shortness of breath [Cough] : no cough [Abdominal Pain] : no abdominal pain [Dysuria] : no dysuria [Headache] : no headache [Dizziness] : no dizziness

## 2022-06-15 NOTE — ASSESSMENT
[FreeTextEntry1] : Patient is 71M w/ PMHx HTN, depression/anxiety, insomnia, migraines, and BPH s/p TURP in 2014 and laser vaporization of prostate in March 2021 presents over telephonic visit to discuss HTN. \par \par \par #HTN \par - advised patient that it is normal for BP to fluctuate daily, depending on stress level, diet, caffeine intake etc\par - counsled patient on proper BP taking technique and advised not to take BP too often, 2-3 weeks is okay. Also discussed taking BP at same time of day when taking \par PLAN \par - refilled lisinoprio 10mg daily \par - continue amlodipine 10mg daily \par - will come to clinic for in person visit next week with me, advised patient to bring BP cuff in with him \par \par #Abxeity \par - patient very anxious on phone, specifically about his BP fluctuation \par - currently on Lexapro 5mg which he was prescribed last visit\par - went over stress relief activities such as exercise and deep breathing \par - will come for in person visit and will discuss his anxiety and possible need for increased dose of medication next week \par \par \par RTC in one week \par

## 2022-06-15 NOTE — END OF VISIT
[FreeTextEntry3] :  I agree with the findings and plan as documented in the Resident's note, unless noted below. 71M presenting via telephonic visit for f/u of HTN, anxiety. Has been measuring BP at home, -140, ran out of lisinopril a few days ago. Pt discussed HTN at length with Dr. Rankin, unfortunately pt did not speak with an attending as he had to leave for a dental appointment. RTC next week with Dr. Rankin per his preference for BP check and f/u of anxiety.

## 2022-06-15 NOTE — HISTORY OF PRESENT ILLNESS
[de-identified] : Patient is 71M w/ PMHx HTN, depression/anxiety, insomnia, migraines, and BPH s/p TURP in 2014 and laser vaporization of prostate in March 2021 presents over telephonic visit to discuss HTN. Patient reports feeling anxious about his blood pressure. He has HTN and currently takes amlodipine 10mg daily and lisinopril 10mg daily, although he ran out of his lisinopril 3 days ago so has not been taking it. Patient reports stress and anxiety every time he passes his BP cuff, and thinks about taking his BP. He is worried that not every BP reading is 120. He does not understand why his BP fluctuates so much. He takes his BP about once per 2-3 weeks. He gets BP readings from 120-140 and is just frustrated that BP is not always normal. \par \par For stress he exercises on his treadmill and StairMaster. He will often lay down also if he feels too stressed. He started Lexapro for anxiety last visit. Patient is asking to come to in person visit next week to discuss BP and anxiety further. Denies HA, blurry vision, chest pain, palpitations or SOB.  [FreeTextEntry1] : PEYTON HAQUE,an established pt at this office, reached out to this provider for follow up on HTN. No recent visit within the last 7 days. A visit is not scheduled within the next 7 days at this time. \par \par Discussed with patient: You have chosen to receive care through the use of tele-media. Tele-media enables health care providers at different locations to provide safe, effective, and convenient care through the use of technology. Please note this is a billable encounter. As with any health care service, there are risks associated with the use of tele-media, including equipment failure, poor image and/or resolution, and  issues. You understand that I cannot physically examine you and that you may need to come to the clinic to complete the assessment. Patient agreed verbally to understanding the risks and benefits of tele-media as explained. All questions regarding tele-media encounters were answered. \par \par Returned phone call to patient to review complaint of HTN. \par \par \par Total time spent with patient on the phone is 20 minutes. \par

## 2022-06-23 ENCOUNTER — APPOINTMENT (OUTPATIENT)
Dept: INTERNAL MEDICINE | Facility: CLINIC | Age: 72
End: 2022-06-23
Payer: MEDICARE

## 2022-06-23 VITALS
SYSTOLIC BLOOD PRESSURE: 143 MMHG | OXYGEN SATURATION: 98 % | DIASTOLIC BLOOD PRESSURE: 84 MMHG | HEART RATE: 126 BPM | TEMPERATURE: 97.1 F

## 2022-06-23 DIAGNOSIS — F32.A ANXIETY DISORDER, UNSPECIFIED: ICD-10-CM

## 2022-06-23 DIAGNOSIS — R00.0 TACHYCARDIA, UNSPECIFIED: ICD-10-CM

## 2022-06-23 DIAGNOSIS — F41.9 ANXIETY DISORDER, UNSPECIFIED: ICD-10-CM

## 2022-06-23 DIAGNOSIS — I10 ESSENTIAL (PRIMARY) HYPERTENSION: ICD-10-CM

## 2022-06-23 PROCEDURE — 99214 OFFICE O/P EST MOD 30 MIN: CPT | Mod: GC

## 2022-06-23 RX ORDER — ESCITALOPRAM OXALATE 5 MG/1
5 TABLET ORAL DAILY
Qty: 14 | Refills: 0 | Status: DISCONTINUED | COMMUNITY
Start: 2022-04-20 | End: 2022-06-23

## 2022-06-23 RX ORDER — ESCITALOPRAM OXALATE 10 MG/1
10 TABLET ORAL DAILY
Qty: 30 | Refills: 2 | Status: ACTIVE | COMMUNITY
Start: 2022-06-23 | End: 1900-01-01

## 2022-06-23 RX ORDER — LISINOPRIL 10 MG/1
10 TABLET ORAL DAILY
Qty: 1 | Refills: 0 | Status: DISCONTINUED | COMMUNITY
Start: 2022-03-16 | End: 2022-06-23

## 2022-07-13 ENCOUNTER — RX RENEWAL (OUTPATIENT)
Age: 72
End: 2022-07-13

## 2022-08-09 ENCOUNTER — APPOINTMENT (OUTPATIENT)
Dept: INTERNAL MEDICINE | Facility: CLINIC | Age: 72
End: 2022-08-09

## 2022-08-16 ENCOUNTER — APPOINTMENT (OUTPATIENT)
Dept: INTERNAL MEDICINE | Facility: CLINIC | Age: 72
End: 2022-08-16

## 2022-08-22 ENCOUNTER — APPOINTMENT (OUTPATIENT)
Dept: INTERNAL MEDICINE | Facility: CLINIC | Age: 72
End: 2022-08-22

## 2022-09-06 RX ORDER — LISINOPRIL 20 MG/1
20 TABLET ORAL DAILY
Qty: 60 | Refills: 0 | Status: ACTIVE | COMMUNITY
Start: 2022-06-23 | End: 1900-01-01

## 2022-09-13 ENCOUNTER — NON-APPOINTMENT (OUTPATIENT)
Age: 72
End: 2022-09-13

## 2022-09-16 ENCOUNTER — APPOINTMENT (OUTPATIENT)
Dept: INTERNAL MEDICINE | Facility: CLINIC | Age: 72
End: 2022-09-16

## 2022-09-30 ENCOUNTER — APPOINTMENT (OUTPATIENT)
Dept: INTERNAL MEDICINE | Facility: CLINIC | Age: 72
End: 2022-09-30

## 2022-10-17 ENCOUNTER — RX RENEWAL (OUTPATIENT)
Age: 72
End: 2022-10-17

## 2022-11-07 ENCOUNTER — RX RENEWAL (OUTPATIENT)
Age: 72
End: 2022-11-07

## 2022-11-08 ENCOUNTER — APPOINTMENT (OUTPATIENT)
Dept: INTERNAL MEDICINE | Facility: CLINIC | Age: 72
End: 2022-11-08

## 2022-11-25 ENCOUNTER — RX RENEWAL (OUTPATIENT)
Age: 72
End: 2022-11-25

## 2022-12-12 ENCOUNTER — RX RENEWAL (OUTPATIENT)
Age: 72
End: 2022-12-12

## 2023-12-04 ENCOUNTER — APPOINTMENT (OUTPATIENT)
Dept: INTERNAL MEDICINE | Facility: CLINIC | Age: 73
End: 2023-12-04

## 2024-03-06 NOTE — BRIEF OPERATIVE NOTE - VENOUS THROMBOEMBOLISM PROPHYLAXIS THERAPY
Detail Level: Detailed
Photo Preface (Leave Blank If You Do Not Want): Photographs were obtained today
SCDs

## 2024-11-14 ENCOUNTER — APPOINTMENT (OUTPATIENT)
Dept: INTERNAL MEDICINE | Facility: CLINIC | Age: 74
End: 2024-11-14

## 2025-02-18 NOTE — ED ADULT NURSE NOTE - PAIN RATING/NUMBER SCALE (0-10): REST
"Caller: Filipe Altamirano \"Arslan\"    Relationship to patient: Self    Best call back number: [phone]    Type of visit: INR    Additional notes: PT IS CALLING TO RESCHEDULE INR APPT ON 2/26/25  "
Rescheduled appoitment  
0

## 2025-06-10 ENCOUNTER — NON-APPOINTMENT (OUTPATIENT)
Age: 75
End: 2025-06-10

## 2025-06-12 ENCOUNTER — APPOINTMENT (OUTPATIENT)
Dept: INTERNAL MEDICINE | Facility: CLINIC | Age: 75
End: 2025-06-12

## 2025-06-19 ENCOUNTER — APPOINTMENT (OUTPATIENT)
Dept: INTERNAL MEDICINE | Facility: CLINIC | Age: 75
End: 2025-06-19
Payer: MEDICARE

## 2025-06-19 VITALS — DIASTOLIC BLOOD PRESSURE: 93 MMHG | OXYGEN SATURATION: 95 % | SYSTOLIC BLOOD PRESSURE: 146 MMHG | HEART RATE: 117 BPM

## 2025-06-19 PROCEDURE — 99214 OFFICE O/P EST MOD 30 MIN: CPT | Mod: 25

## 2025-06-19 PROCEDURE — 36415 COLL VENOUS BLD VENIPUNCTURE: CPT

## 2025-06-19 RX ORDER — HYDROCHLOROTHIAZIDE 25 MG/1
25 TABLET ORAL DAILY
Qty: 30 | Refills: 0 | Status: ACTIVE | COMMUNITY
Start: 2025-06-19 | End: 1900-01-01

## 2025-06-20 ENCOUNTER — NON-APPOINTMENT (OUTPATIENT)
Age: 75
End: 2025-06-20

## 2025-06-20 PROBLEM — M79.89 SWELLING OF LOWER EXTREMITY: Status: RESOLVED | Noted: 2025-06-19 | Resolved: 2025-06-20

## 2025-06-20 PROBLEM — Z87.898 HISTORY OF INSOMNIA: Status: RESOLVED | Noted: 2021-03-01 | Resolved: 2025-06-20

## 2025-06-20 PROBLEM — Z87.898 HISTORY OF URINARY RETENTION: Status: RESOLVED | Noted: 2021-01-15 | Resolved: 2025-06-20

## 2025-06-20 PROBLEM — N40.1 BPH WITH OBSTRUCTION/LOWER URINARY TRACT SYMPTOMS: Status: RESOLVED | Noted: 2021-01-15 | Resolved: 2025-06-20

## 2025-06-20 PROBLEM — Z92.29 HISTORY OF PNEUMOCOCCAL VACCINATION: Status: RESOLVED | Noted: 2021-03-01 | Resolved: 2025-06-20

## 2025-06-20 PROBLEM — Z86.79 HISTORY OF HYPERTENSION: Status: RESOLVED | Noted: 2021-09-02 | Resolved: 2025-06-20

## 2025-06-20 PROBLEM — N17.9 AKI (ACUTE KIDNEY INJURY): Status: RESOLVED | Noted: 2022-04-20 | Resolved: 2025-06-20

## 2025-06-20 PROBLEM — Z92.29 HISTORY OF INFLUENZA VACCINATION: Status: RESOLVED | Noted: 2021-03-01 | Resolved: 2025-06-20

## 2025-06-20 PROBLEM — Z12.11 SCREENING FOR COLON CANCER: Status: RESOLVED | Noted: 2021-03-01 | Resolved: 2025-06-20

## 2025-06-20 RX ORDER — ATORVASTATIN CALCIUM 40 MG/1
40 TABLET, FILM COATED ORAL
Qty: 90 | Refills: 1 | Status: ACTIVE | COMMUNITY
Start: 2025-06-20 | End: 1900-01-01

## 2025-06-23 ENCOUNTER — NON-APPOINTMENT (OUTPATIENT)
Age: 75
End: 2025-06-23

## 2025-06-23 LAB
ALBUMIN SERPL ELPH-MCNC: 4.3 G/DL
ALP BLD-CCNC: 102 U/L
ALT SERPL-CCNC: 22 U/L
ANION GAP SERPL CALC-SCNC: 13 MMOL/L
AST SERPL-CCNC: 29 U/L
BASOPHILS # BLD AUTO: 0.03 K/UL
BASOPHILS NFR BLD AUTO: 0.3 %
BILIRUB SERPL-MCNC: 0.6 MG/DL
BUN SERPL-MCNC: 19 MG/DL
CALCIUM SERPL-MCNC: 9.1 MG/DL
CHLORIDE SERPL-SCNC: 105 MMOL/L
CHOLEST SERPL-MCNC: 210 MG/DL
CO2 SERPL-SCNC: 24 MMOL/L
CREAT SERPL-MCNC: 0.99 MG/DL
EGFRCR SERPLBLD CKD-EPI 2021: 80 ML/MIN/1.73M2
EOSINOPHIL # BLD AUTO: 0.09 K/UL
EOSINOPHIL NFR BLD AUTO: 1 %
ESTIMATED AVERAGE GLUCOSE: 123 MG/DL
GLUCOSE SERPL-MCNC: 119 MG/DL
HBA1C MFR BLD HPLC: 5.9 %
HCT VFR BLD CALC: 54.1 %
HDLC SERPL-MCNC: 41 MG/DL
HGB BLD-MCNC: 16.7 G/DL
IMM GRANULOCYTES NFR BLD AUTO: 0.4 %
LDLC SERPL-MCNC: 135 MG/DL
LYMPHOCYTES # BLD AUTO: 1.04 K/UL
LYMPHOCYTES NFR BLD AUTO: 11.1 %
MAN DIFF?: NORMAL
MCHC RBC-ENTMCNC: 27.4 PG
MCHC RBC-ENTMCNC: 30.9 G/DL
MCV RBC AUTO: 88.7 FL
MONOCYTES # BLD AUTO: 0.59 K/UL
MONOCYTES NFR BLD AUTO: 6.3 %
NEUTROPHILS # BLD AUTO: 7.54 K/UL
NEUTROPHILS NFR BLD AUTO: 80.9 %
NONHDLC SERPL-MCNC: 169 MG/DL
PLATELET # BLD AUTO: 171 K/UL
POTASSIUM SERPL-SCNC: 4.6 MMOL/L
PROT SERPL-MCNC: 7.6 G/DL
RBC # BLD: 6.1 M/UL
RBC # FLD: 14.4 %
SODIUM SERPL-SCNC: 142 MMOL/L
TRIGL SERPL-MCNC: 189 MG/DL
WBC # FLD AUTO: 9.33 K/UL

## 2025-07-02 ENCOUNTER — APPOINTMENT (OUTPATIENT)
Dept: INTERNAL MEDICINE | Facility: CLINIC | Age: 75
End: 2025-07-02
Payer: MEDICARE

## 2025-07-02 PROBLEM — E78.5 HLD (HYPERLIPIDEMIA): Status: ACTIVE | Noted: 2025-06-20

## 2025-07-02 PROBLEM — R60.0 EDEMA, LEG: Status: ACTIVE | Noted: 2025-06-19

## 2025-07-02 PROCEDURE — 99214 OFFICE O/P EST MOD 30 MIN: CPT | Mod: 25,GC

## 2025-07-02 PROCEDURE — 36415 COLL VENOUS BLD VENIPUNCTURE: CPT

## 2025-07-03 LAB
ANION GAP SERPL CALC-SCNC: 15 MMOL/L
BUN SERPL-MCNC: 21 MG/DL
CALCIUM SERPL-MCNC: 9.6 MG/DL
CHLORIDE SERPL-SCNC: 101 MMOL/L
CO2 SERPL-SCNC: 26 MMOL/L
CREAT SERPL-MCNC: 1.11 MG/DL
EGFRCR SERPLBLD CKD-EPI 2021: 70 ML/MIN/1.73M2
GLUCOSE SERPL-MCNC: 103 MG/DL
POTASSIUM SERPL-SCNC: 3.9 MMOL/L
SODIUM SERPL-SCNC: 142 MMOL/L
TSH SERPL-ACNC: 1.36 UIU/ML

## 2025-07-03 RX ORDER — CHLORTHALIDONE 25 MG/1
25 TABLET ORAL DAILY
Qty: 15 | Refills: 0 | Status: ACTIVE | COMMUNITY
Start: 2025-07-03 | End: 1900-01-01

## 2025-07-14 ENCOUNTER — APPOINTMENT (OUTPATIENT)
Dept: SLEEP CENTER | Facility: HOME HEALTH | Age: 75
End: 2025-07-14

## 2025-07-30 ENCOUNTER — RX RENEWAL (OUTPATIENT)
Age: 75
End: 2025-07-30

## 2025-08-20 ENCOUNTER — APPOINTMENT (OUTPATIENT)
Dept: INTERNAL MEDICINE | Facility: CLINIC | Age: 75
End: 2025-08-20

## 2025-08-22 ENCOUNTER — APPOINTMENT (OUTPATIENT)
Dept: INTERNAL MEDICINE | Facility: CLINIC | Age: 75
End: 2025-08-22

## 2025-08-22 VITALS
OXYGEN SATURATION: 98 % | HEIGHT: 68 IN | SYSTOLIC BLOOD PRESSURE: 135 MMHG | RESPIRATION RATE: 18 BRPM | DIASTOLIC BLOOD PRESSURE: 91 MMHG | HEART RATE: 101 BPM

## 2025-08-22 DIAGNOSIS — E78.5 HYPERLIPIDEMIA, UNSPECIFIED: ICD-10-CM

## 2025-08-22 DIAGNOSIS — R60.0 LOCALIZED EDEMA: ICD-10-CM

## 2025-08-22 DIAGNOSIS — E63.9 NUTRITIONAL DEFICIENCY, UNSPECIFIED: ICD-10-CM

## 2025-08-22 PROCEDURE — 36415 COLL VENOUS BLD VENIPUNCTURE: CPT

## 2025-08-22 PROCEDURE — 99214 OFFICE O/P EST MOD 30 MIN: CPT | Mod: 25

## 2025-08-25 RX ORDER — TAMSULOSIN HYDROCHLORIDE 0.4 MG/1
0.4 CAPSULE ORAL
Qty: 30 | Refills: 1 | Status: ACTIVE | COMMUNITY
Start: 2025-08-22 | End: 1900-01-01

## 2025-08-27 ENCOUNTER — APPOINTMENT (OUTPATIENT)
Dept: INTERNAL MEDICINE | Facility: CLINIC | Age: 75
End: 2025-08-27
Payer: MEDICARE

## 2025-08-27 ENCOUNTER — NON-APPOINTMENT (OUTPATIENT)
Age: 75
End: 2025-08-27

## 2025-08-27 VITALS
DIASTOLIC BLOOD PRESSURE: 88 MMHG | RESPIRATION RATE: 18 BRPM | SYSTOLIC BLOOD PRESSURE: 143 MMHG | OXYGEN SATURATION: 95 % | HEART RATE: 111 BPM | TEMPERATURE: 96.8 F

## 2025-08-27 DIAGNOSIS — I10 ESSENTIAL (PRIMARY) HYPERTENSION: ICD-10-CM

## 2025-08-27 DIAGNOSIS — N40.0 BENIGN PROSTATIC HYPERPLASIA WITHOUT LOWER URINARY TRACT SYMPMS: ICD-10-CM

## 2025-08-27 DIAGNOSIS — Z87.438 PERSONAL HISTORY OF OTHER DISEASES OF MALE GENITAL ORGANS: ICD-10-CM

## 2025-08-27 LAB
ANION GAP SERPL CALC-SCNC: 14 MMOL/L
BUN SERPL-MCNC: 18 MG/DL
CALCIUM SERPL-MCNC: 9.6 MG/DL
CHLORIDE SERPL-SCNC: 96 MMOL/L
CO2 SERPL-SCNC: 29 MMOL/L
CREAT SERPL-MCNC: 1.03 MG/DL
EGFRCR SERPLBLD CKD-EPI 2021: 76 ML/MIN/1.73M2
GLUCOSE SERPL-MCNC: 95 MG/DL
POTASSIUM SERPL-SCNC: 3.9 MMOL/L
SODIUM SERPL-SCNC: 139 MMOL/L

## 2025-08-27 PROCEDURE — 99214 OFFICE O/P EST MOD 30 MIN: CPT

## 2025-08-27 PROCEDURE — G2211 COMPLEX E/M VISIT ADD ON: CPT

## 2025-09-17 ENCOUNTER — NON-APPOINTMENT (OUTPATIENT)
Age: 75
End: 2025-09-17

## 2025-09-17 ENCOUNTER — APPOINTMENT (OUTPATIENT)
Dept: INTERNAL MEDICINE | Facility: CLINIC | Age: 75
End: 2025-09-17

## 2025-09-17 DIAGNOSIS — I10 ESSENTIAL (PRIMARY) HYPERTENSION: ICD-10-CM

## 2025-09-17 RX ORDER — OLMESARTAN MEDOXOMIL 5 MG/1
5 TABLET, FILM COATED ORAL DAILY
Qty: 30 | Refills: 0 | Status: ACTIVE | COMMUNITY
Start: 2025-09-17 | End: 1900-01-01